# Patient Record
Sex: MALE | Race: WHITE | NOT HISPANIC OR LATINO | Employment: PART TIME | URBAN - METROPOLITAN AREA
[De-identification: names, ages, dates, MRNs, and addresses within clinical notes are randomized per-mention and may not be internally consistent; named-entity substitution may affect disease eponyms.]

---

## 2018-05-29 ENCOUNTER — TRANSCRIBE ORDERS (OUTPATIENT)
Dept: URGENT CARE | Facility: MEDICAL CENTER | Age: 23
End: 2018-05-29

## 2018-05-29 ENCOUNTER — APPOINTMENT (OUTPATIENT)
Dept: URGENT CARE | Facility: MEDICAL CENTER | Age: 23
End: 2018-05-29

## 2018-05-29 DIAGNOSIS — Z00.00 PHYSICAL EXAM: ICD-10-CM

## 2018-05-29 DIAGNOSIS — Z00.00 PHYSICAL EXAM: Primary | ICD-10-CM

## 2018-05-29 PROCEDURE — 86480 TB TEST CELL IMMUN MEASURE: CPT

## 2018-05-31 LAB
ANNOTATION COMMENT IMP: NORMAL
GAMMA INTERFERON BACKGROUND BLD IA-ACNC: 0.02 IU/ML
M TB IFN-G BLD-IMP: NEGATIVE
M TB IFN-G CD4+ BCKGRND COR BLD-ACNC: 0 IU/ML
M TB IFN-G CD4+ T-CELLS BLD-ACNC: 0.02 IU/ML
MITOGEN IGNF BLD-ACNC: 7.08 IU/ML
QUANTIFERON-TB GOLD IN TUBE: NORMAL
SERVICE CMNT-IMP: NORMAL

## 2019-07-24 ENCOUNTER — APPOINTMENT (OUTPATIENT)
Dept: LAB | Facility: CLINIC | Age: 24
End: 2019-07-24
Payer: COMMERCIAL

## 2019-07-24 ENCOUNTER — OFFICE VISIT (OUTPATIENT)
Dept: FAMILY MEDICINE CLINIC | Facility: CLINIC | Age: 24
End: 2019-07-24
Payer: COMMERCIAL

## 2019-07-24 VITALS
SYSTOLIC BLOOD PRESSURE: 128 MMHG | BODY MASS INDEX: 28.8 KG/M2 | WEIGHT: 248.9 LBS | HEIGHT: 78 IN | DIASTOLIC BLOOD PRESSURE: 82 MMHG | HEART RATE: 78 BPM | TEMPERATURE: 97.9 F

## 2019-07-24 DIAGNOSIS — Z01.84 IMMUNITY STATUS TESTING: ICD-10-CM

## 2019-07-24 DIAGNOSIS — Z23 ENCOUNTER FOR IMMUNIZATION: ICD-10-CM

## 2019-07-24 DIAGNOSIS — Z76.89 ENCOUNTER TO ESTABLISH CARE: ICD-10-CM

## 2019-07-24 DIAGNOSIS — Z00.00 ANNUAL PHYSICAL EXAM: Primary | ICD-10-CM

## 2019-07-24 PROCEDURE — 86706 HEP B SURFACE ANTIBODY: CPT

## 2019-07-24 PROCEDURE — 90715 TDAP VACCINE 7 YRS/> IM: CPT | Performed by: NURSE PRACTITIONER

## 2019-07-24 PROCEDURE — 36415 COLL VENOUS BLD VENIPUNCTURE: CPT

## 2019-07-24 PROCEDURE — 90471 IMMUNIZATION ADMIN: CPT | Performed by: NURSE PRACTITIONER

## 2019-07-24 PROCEDURE — 99385 PREV VISIT NEW AGE 18-39: CPT | Performed by: NURSE PRACTITIONER

## 2019-07-24 NOTE — PATIENT INSTRUCTIONS

## 2019-07-24 NOTE — PROGRESS NOTES
NAME: Lucina Mackenzie  AGE: 27 y o  SEX: male  : 1995     DATE: 2019     Assessment and Plan:     Problem List Items Addressed This Visit     None      Visit Diagnoses     Annual physical exam    -  Primary    Encounter to establish care        Immunity status testing        Relevant Orders    Hepatitis B surface antibody    Encounter for immunization        Relevant Orders    TDAP VACCINE GREATER THAN OR EQUAL TO 8YO IM (Completed)        Immunizations and preventive care screenings were discussed with patient today  Appropriate education was printed on patient's after visit summary  Counseling:  BMI Counseling: Body mass index is 28 76 kg/m²  Discussed the patient's BMI with him  The BMI is above average  BMI counseling and education was provided to the patient  Nutrition recommendations include reducing portion sizes, decreasing overall calorie intake, consuming healthier snacks, moderation in carbohydrate intake, increasing intake of lean protein and reducing intake of saturated fat and trans fat  Exercise recommendations include exercising 3-5 times per week and strength training exercises  Alcohol/drug use: discussed moderation in alcohol intake and avoidance of illicit drug use  Dental Health: discussed importance of regular tooth brushing, flossing, and dental visits  Injury prevention: discussed safety/seat belts, safety helmets, smoke detectors, carbon dioxide detectors, and smoking near bedding or upholstery  · Sexual health: discussed sexually transmitted diseases, partner selection, use of condoms, avoidance of unintended pregnancy, and contraceptive alternatives  Return in 1 year (on 2020)       Chief Complaint:     Chief Complaint   Patient presents with    Physical Exam      History of Present Illness:     Adult Annual Physical   24 y o male presenting with to become establish to the practice, obtain physical examination for college and to discuss current health concerns  The patient reports no problems  Patient's previous primary care provider  was Dr Uziel Lucero MD (pediatrician)  Patient will be attending [address] [place] [address] in the Fall 2019  General Health: The patient's describes his/her overall health as excellent  Health Maintenance:  She has regular dental visits  Denies vision problems (wears glasses/contacts)  Denies hearing loss (no assistive devices) Immunization status: up to date  Lifestyle: Consumes a diverse and healthy diet  Does not have weight concerns  Exercises regularly  Does not use tobacco  Denies alcohol use  Denies drug use    Diet and Physical Activity  · Diet/Nutrition: well balanced diet, limited junk food and consuming 3-5 servings of fruits/vegetables daily  · Exercise: 5-7 times a week on average and 30-60 minutes on average  Depression Screening  PHQ-9 Depression Screening    PHQ-9:    Frequency of the following problems over the past two weeks:       Little interest or pleasure in doing things:  0 - not at all  Feeling down, depressed, or hopeless:  0 - not at all  PHQ-2 Score:  0       General Health  · Sleep: sleeps well and gets 7-8 hours of sleep on average  · Hearing: normal - bilateral   · Vision: no vision problems, goes for regular eye exams, most recent eye exam <1 year ago and wears glasses and contacts  · Dental: regular dental visits, brushes teeth twice daily and flosses teeth occasionally   Health  · History of STDs?: no      Review of Systems:     Review of Systems   Constitutional: Negative  HENT: Negative  Eyes: Negative  Respiratory: Negative  Cardiovascular: Negative  Gastrointestinal: Negative  Endocrine: Negative  Genitourinary: Negative  Musculoskeletal: Negative  Skin: Negative  Allergic/Immunologic: Negative  Neurological: Negative  Hematological: Negative  Psychiatric/Behavioral: Negative         Past Medical History:     Past Medical History:   Diagnosis Date    Hypothyroidism     childhood      Past Surgical History:     Past Surgical History:   Procedure Laterality Date    ANTERIOR CRUCIATE LIGAMENT REPAIR Left       Social History:     Social History     Socioeconomic History    Marital status: Single     Spouse name: None    Number of children: None    Years of education: None    Highest education level: None   Occupational History    None   Social Needs    Financial resource strain: None    Food insecurity:     Worry: None     Inability: None    Transportation needs:     Medical: None     Non-medical: None   Tobacco Use    Smoking status: Never Smoker    Smokeless tobacco: Never Used   Substance and Sexual Activity    Alcohol use: Yes     Comment: social    Drug use: Never    Sexual activity: None   Lifestyle    Physical activity:     Days per week: None     Minutes per session: None    Stress: None   Relationships    Social connections:     Talks on phone: None     Gets together: None     Attends Confucianist service: None     Active member of club or organization: None     Attends meetings of clubs or organizations: None     Relationship status: None    Intimate partner violence:     Fear of current or ex partner: None     Emotionally abused: None     Physically abused: None     Forced sexual activity: None   Other Topics Concern    None   Social History Narrative    None      Family History:     Family History   Problem Relation Age of Onset    Hypertension Mother     Diabetes Father     Diabetes Paternal Grandfather       Current Medications:     No current outpatient medications on file  No current facility-administered medications for this visit         Allergies:     No Known Allergies   Physical Exam:     /82 (BP Location: Right arm, Patient Position: Sitting, Cuff Size: Standard)   Pulse 78   Temp 97 9 °F (36 6 °C)   Ht 6' 6" (1 981 m)   Wt 113 kg (248 lb 14 4 oz)   BMI 28 76 kg/m² (Reviewed)    Physical Exam   Constitutional: He is oriented to person, place, and time  Vital signs are normal  He appears well-developed and well-nourished  HENT:   Head: Normocephalic and atraumatic  Right Ear: Tympanic membrane, external ear and ear canal normal    Left Ear: Tympanic membrane, external ear and ear canal normal    Nose: Nose normal    Mouth/Throat: Uvula is midline, oropharynx is clear and moist and mucous membranes are normal    Eyes: Pupils are equal, round, and reactive to light  Conjunctivae, EOM and lids are normal    Neck: Trachea normal  Neck supple  Cardiovascular: Normal rate, regular rhythm, normal heart sounds and intact distal pulses  Pulmonary/Chest: Effort normal and breath sounds normal    Abdominal: Soft  Bowel sounds are normal  There is no tenderness  Musculoskeletal: Normal range of motion  Lymphadenopathy:     He has no cervical adenopathy  Neurological: He is alert and oriented to person, place, and time  He has normal strength and normal reflexes  No cranial nerve deficit or sensory deficit  He displays a negative Romberg sign  Skin: Skin is warm and dry  Capillary refill takes less than 2 seconds  No cyanosis  Nails show no clubbing  Psychiatric: He has a normal mood and affect  His speech is normal and behavior is normal  Judgment and thought content normal      BMI Counseling: Body mass index is 28 76 kg/m²  Discussed the patient's BMI with him  The BMI is above average  BMI counseling and education was provided to the patient  Nutrition recommendations include reducing portion sizes, decreasing overall calorie intake, consuming healthier snacks, moderation in carbohydrate intake, increasing intake of lean protein and reducing intake of saturated fat and trans fat  Exercise recommendations include exercising 3-5 times per week

## 2019-07-25 LAB — HBV SURFACE AB SER-ACNC: 22.6 MIU/ML

## 2019-08-25 ENCOUNTER — TRANSCRIBE ORDERS (OUTPATIENT)
Dept: URGENT CARE | Facility: MEDICAL CENTER | Age: 24
End: 2019-08-25

## 2019-08-25 ENCOUNTER — APPOINTMENT (OUTPATIENT)
Dept: URGENT CARE | Facility: MEDICAL CENTER | Age: 24
End: 2019-08-25

## 2019-08-25 DIAGNOSIS — Z02.1 PRE-EMPLOYMENT HEALTH SCREENING EXAMINATION: Primary | ICD-10-CM

## 2019-08-25 DIAGNOSIS — Z02.1 PRE-EMPLOYMENT HEALTH SCREENING EXAMINATION: ICD-10-CM

## 2019-08-25 PROCEDURE — 86480 TB TEST CELL IMMUN MEASURE: CPT

## 2019-08-27 LAB
GAMMA INTERFERON BACKGROUND BLD IA-ACNC: 0.03 IU/ML
M TB IFN-G BLD-IMP: NEGATIVE
M TB IFN-G CD4+ BCKGRND COR BLD-ACNC: -0.01 IU/ML
M TB IFN-G CD4+ BCKGRND COR BLD-ACNC: 0 IU/ML
MITOGEN IGNF BCKGRD COR BLD-ACNC: >10 IU/ML

## 2020-12-07 ENCOUNTER — TELEPHONE (OUTPATIENT)
Dept: FAMILY MEDICINE CLINIC | Facility: CLINIC | Age: 25
End: 2020-12-07

## 2020-12-07 DIAGNOSIS — Z03.818 ENCOUNTER FOR OBSERVATION FOR SUSPECTED EXPOSURE TO OTHER BIOLOGICAL AGENTS RULED OUT: Primary | ICD-10-CM

## 2020-12-08 DIAGNOSIS — Z03.818 ENCOUNTER FOR OBSERVATION FOR SUSPECTED EXPOSURE TO OTHER BIOLOGICAL AGENTS RULED OUT: ICD-10-CM

## 2020-12-08 PROCEDURE — U0003 INFECTIOUS AGENT DETECTION BY NUCLEIC ACID (DNA OR RNA); SEVERE ACUTE RESPIRATORY SYNDROME CORONAVIRUS 2 (SARS-COV-2) (CORONAVIRUS DISEASE [COVID-19]), AMPLIFIED PROBE TECHNIQUE, MAKING USE OF HIGH THROUGHPUT TECHNOLOGIES AS DESCRIBED BY CMS-2020-01-R: HCPCS | Performed by: NURSE PRACTITIONER

## 2020-12-09 LAB — SARS-COV-2 RNA SPEC QL NAA+PROBE: NOT DETECTED

## 2021-01-22 ENCOUNTER — TELEPHONE (OUTPATIENT)
Dept: FAMILY MEDICINE CLINIC | Facility: CLINIC | Age: 26
End: 2021-01-22

## 2021-02-05 ENCOUNTER — IMMUNIZATIONS (OUTPATIENT)
Dept: FAMILY MEDICINE CLINIC | Facility: HOSPITAL | Age: 26
End: 2021-02-05

## 2021-02-05 DIAGNOSIS — Z23 ENCOUNTER FOR IMMUNIZATION: Primary | ICD-10-CM

## 2021-02-05 PROCEDURE — 91300 SARS-COV-2 / COVID-19 MRNA VACCINE (PFIZER-BIONTECH) 30 MCG: CPT

## 2021-02-05 PROCEDURE — 0001A SARS-COV-2 / COVID-19 MRNA VACCINE (PFIZER-BIONTECH) 30 MCG: CPT

## 2021-02-26 ENCOUNTER — IMMUNIZATIONS (OUTPATIENT)
Dept: FAMILY MEDICINE CLINIC | Facility: HOSPITAL | Age: 26
End: 2021-02-26

## 2021-02-26 DIAGNOSIS — Z23 ENCOUNTER FOR IMMUNIZATION: Primary | ICD-10-CM

## 2021-02-26 PROCEDURE — 0002A SARS-COV-2 / COVID-19 MRNA VACCINE (PFIZER-BIONTECH) 30 MCG: CPT

## 2021-02-26 PROCEDURE — 91300 SARS-COV-2 / COVID-19 MRNA VACCINE (PFIZER-BIONTECH) 30 MCG: CPT

## 2022-03-20 ENCOUNTER — OFFICE VISIT (OUTPATIENT)
Dept: URGENT CARE | Age: 27
End: 2022-03-20
Payer: COMMERCIAL

## 2022-03-20 VITALS — HEART RATE: 120 BPM | RESPIRATION RATE: 20 BRPM | TEMPERATURE: 97.6 F | OXYGEN SATURATION: 97 %

## 2022-03-20 DIAGNOSIS — R68.89 FLU-LIKE SYMPTOMS: Primary | ICD-10-CM

## 2022-03-20 DIAGNOSIS — H65.02 NON-RECURRENT ACUTE SEROUS OTITIS MEDIA OF LEFT EAR: ICD-10-CM

## 2022-03-20 PROCEDURE — S9083 URGENT CARE CENTER GLOBAL: HCPCS

## 2022-03-20 PROCEDURE — G0382 LEV 3 HOSP TYPE B ED VISIT: HCPCS

## 2022-03-20 PROCEDURE — 87636 SARSCOV2 & INF A&B AMP PRB: CPT

## 2022-03-20 RX ORDER — AMOXICILLIN AND CLAVULANATE POTASSIUM 875; 125 MG/1; MG/1
1 TABLET, FILM COATED ORAL EVERY 12 HOURS SCHEDULED
Qty: 20 TABLET | Refills: 0 | Status: SHIPPED | OUTPATIENT
Start: 2022-03-20 | End: 2022-03-30

## 2022-03-20 NOTE — PROGRESS NOTES
[facility]        NAME: Brittani Castillo is a 24 y o  male  : 1995    MRN: [id number]  DATE: 2022  TIME: 8:00 PM    Assessment and Plan   Flu-like symptoms [R68 89]  1  Flu-like symptoms  [facility]   2  Non-recurrent acute serous otitis media of left ear  amoxicillin-clavulanate (AUGMENTIN) 875-125 mg per tablet     COVID/flu test pending  Patient Instructions     Antibiotics as discussed for otitis media  Appropriate Tylenol as needed for fever  Follow up with PCP in 3-5 days  Proceed to  ER if symptoms worsen  Chief Complaint     Chief Complaint   Patient presents with    Cold Like Symptoms     c/o fever , bodyaches,chills x yesterday am         History of Present Illness       Patient presenting for evaluation of fever, congestion, cough, runny nose, body aches and low back pain  Patient states that he read recently traveled out of state, and when he returned he developed the symptoms  He states that he is vaccinated for both COVID and flu  He states that he has been taking Tylenol and ibuprofen for his symptoms  He states his T-max was 103  6  He states that he has a decreased appetite, but is able to tolerate liquids and has been urinating as normal   He denies any chest pain, shortness of breath, diarrhea or vomiting at this time      Review of Systems   Review of Systems   Constitutional: Positive for chills, fatigue and fever  HENT: Positive for congestion, ear pain and sore throat  Eyes: Negative  Respiratory: Positive for cough  Negative for shortness of breath  Cardiovascular: Negative  Gastrointestinal: Negative for abdominal pain, diarrhea and vomiting  Endocrine: Negative  Genitourinary: Negative  Musculoskeletal: Positive for back pain and myalgias  Skin: Negative  Allergic/Immunologic: Negative  Neurological: Negative for headaches  Hematological: Negative      Psychiatric/Behavioral: Negative  Current Medications       Current Outpatient Medications:     amoxicillin-clavulanate (AUGMENTIN) 875-125 mg per tablet, Take 1 tablet by mouth every 12 (twelve) hours for 10 days, Disp: 20 tablet, Rfl: 0    Current Allergies     Allergies as of 03/20/2022    (No Known Allergies)            The following portions of the patient's history were reviewed and updated as appropriate: allergies, current medications, past family history, past medical history, past social history, past surgical history and problem list      Past Medical History:   Diagnosis Date    Hypothyroidism     childhood       Past Surgical History:   Procedure Laterality Date    ANTERIOR CRUCIATE LIGAMENT REPAIR Left        Family History   Problem Relation Age of Onset    Hypertension Mother     Diabetes Father     Diabetes Paternal Grandfather          Medications have been verified  Objective   Pulse (!) 120   Temp 97 6 °F (36 4 °C)   Resp 20   SpO2 97%        Physical Exam     Physical Exam  Vitals and nursing note reviewed  Constitutional:       General: He is not in acute distress  Appearance: Normal appearance  He is not ill-appearing  HENT:      Head: Normocephalic and atraumatic  Right Ear: Tympanic membrane is erythematous  Left Ear: Tympanic membrane is injected, erythematous and bulging  Nose: Congestion present  No rhinorrhea  Mouth/Throat:      Mouth: Mucous membranes are moist       Pharynx: Oropharynx is clear  Uvula midline  Posterior oropharyngeal erythema present  No oropharyngeal exudate or uvula swelling  Tonsils: No tonsillar exudate or tonsillar abscesses  0 on the right  0 on the left  Eyes:      Conjunctiva/sclera: Conjunctivae normal    Cardiovascular:      Rate and Rhythm: Normal rate and regular rhythm  Pulses: Normal pulses  Heart sounds: Normal heart sounds  No murmur heard  No friction rub  No gallop      Pulmonary:      Effort: Pulmonary effort is normal  No respiratory distress  Breath sounds: Normal breath sounds  No stridor  No wheezing, rhonchi or rales  Abdominal:      General: Bowel sounds are normal       Palpations: Abdomen is soft  Tenderness: There is no abdominal tenderness  Musculoskeletal:         General: Normal range of motion  Cervical back: Normal range of motion and neck supple  Skin:     General: Skin is warm and dry  Capillary Refill: Capillary refill takes less than 2 seconds  Neurological:      General: No focal deficit present  Mental Status: He is alert and oriented to person, place, and time     Psychiatric:         Mood and Affect: Mood normal

## 2022-03-20 NOTE — PATIENT INSTRUCTIONS
1  Drink plenty fluids  2   Take probiotics [i e  Yogurt, Acidophilus, Florastor (liquid)] daily  3   Over-the-counter medications as needed for symptomatic care  4    Advance activities as tolerated  5    Follow-up with your primary care physician in 3-4 days  6   Go to emergency room if symptoms are worsening  7   Use a humidifier at bedtime  Gastroenteritis   WHAT YOU NEED TO KNOW:   Gastroenteritis, or stomach flu, is an infection of the stomach and intestines  DISCHARGE INSTRUCTIONS:   Call 911 for any of the following:   · You have trouble breathing or a very fast pulse  Return to the emergency department if:   · You see blood in your diarrhea  · You cannot stop vomiting  · You have not urinated for 12 hours  · You feel like you are going to faint  Contact your healthcare provider if:   · You have a fever  · You continue to vomit or have diarrhea, even after treatment  · You see worms in your diarrhea  · Your mouth or eyes are dry  You are not urinating as much or as often  · You have questions or concerns about your condition or care  Medicines:   · Medicines  may be given to stop vomiting or diarrhea, decrease abdominal cramps, or treat an infection  · Take your medicine as directed  Contact your healthcare provider if you think your medicine is not helping or if you have side effects  Tell him or her if you are allergic to any medicine  Keep a list of the medicines, vitamins, and herbs you take  Include the amounts, and when and why you take them  Bring the list or the pill bottles to follow-up visits  Carry your medicine list with you in case of an emergency  Manage your symptoms:   · Drink liquids as directed  Ask your healthcare provider how much liquid to drink each day, and which liquids are best for you  You may also need to drink an oral rehydration solution (ORS)   An ORS has the right amounts of sugar, salt, and minerals in water to replace body fluids  · Eat bland foods  When you feel hungry, begin eating soft, bland foods  Examples are bananas, clear soup, potatoes, and applesauce  Do not have dairy products, alcohol, sugary drinks, or drinks with caffeine until you feel better  · Rest as much as possible  Slowly start to do more each day when you begin to feel better  Prevent the spread of gastroenteritis:  Gastroenteritis can spread easily  Keep yourself, your family, and your surroundings clean to help prevent the spread of gastroenteritis:  · Wash your hands often  Use soap and water  Wash your hands after you use the bathroom, change a child's diapers, or sneeze  Wash your hands before you prepare or eat food  · Clean surfaces and do laundry often  Wash your clothes and towels separately from the rest of the laundry  Clean surfaces in your home with antibacterial  or bleach  · Clean food thoroughly and cook safely  Wash raw vegetables before you cook  Cook meat, fish, and eggs fully  Do not use the same dishes for raw meat as you do for other foods  Refrigerate any leftover food immediately  · Be aware when you camp or travel  Drink only clean water  Do not drink from rivers or lakes unless you purify or boil the water first  When you travel, drink bottled water and do not add ice  Do not eat fruit that has not been peeled  Do not eat raw fish or meat that is not fully cooked  Follow up with your doctor as directed:  Write down your questions so you remember to ask them during your visits  © Copyright [employer] 2022 Information is for End User's use only and may not be sold, redistributed or otherwise used for commercial purposes  All illustrations and images included in CareNotes® are the copyrighted property of [employer]  or [employer]  The above information is an  only  It is not intended as medical advice for individual conditions or treatments   Talk to your doctor, nurse or pharmacist before following any medical regimen to see if it is safe and effective for you  Otitis Media, Ambulatory Care   GENERAL INFORMATION:   Otitis media  is an ear infection  Common symptoms include the following:   · Fever or a headache    · Ear pain    · Trouble hearing    · Ear feels plugged or full or you have ringing or buzzing in your ear    · Dizziness or you lose your balance    · Nausea or vomiting  Seek immediate care for the following symptoms:   · Seizure    · Fever and a stiff neck  Treatment for otitis media  may include any of the following:  · NSAIDs  help decrease swelling and pain or fever  This medicine is available with or without a doctor's order  NSAIDs can cause stomach bleeding or kidney problems in certain people  If you take blood thinner medicine, always ask your healthcare provider if NSAIDs are safe for you  Always read the medicine label and follow directions  · Ear drops  to help treat your ear pain  · Antibiotics  to help kill the germs that caused your ear infection  Care for otitis media:   · Use heat  Place a warm, moist washcloth on your ear to decrease pain  Apply for 15 to 20 minutes, 3 to 4 times a day    · Use ice  Ice helps decrease swelling and pain  Use an ice pack or put crushed ice in a plastic bag  Cover the ice pack with a towel and place it on your ear for 15 to 20 minutes, 3 to 4 times a day for 2 days  Prevent otitis media:   · Wash your hands often  This will help prevent the spread of germs  Encourage everyone in your house to wash their hands with soap and water after they use the bathroom  Everyone should also wash their hands after they change a child's diaper and before they prepare or eat food  · Stay away from people who are ill  Germs are easily and quickly spread through contact  Follow up with your healthcare provider as directed:  Write down your questions so you remember to ask them during your visits     CARE AGREEMENT:   You have the right to help plan your care  Learn about your health condition and how it may be treated  Discuss treatment options with your caregivers to decide what care you want to receive  You always have the right to refuse treatment  The above information is an  only  It is not intended as medical advice for individual conditions or treatments  Talk to your doctor, nurse or pharmacist before following any medical regimen to see if it is safe and effective for you  © 2014 [address] is for End User's use only and may not be sold, redistributed or otherwise used for commercial purposes  All illustrations and images included in CareNotes® are the copyrighted property of [employer]  or [employer]

## 2022-03-21 LAB
FLUAV RNA RESP QL NAA+PROBE: POSITIVE
FLUBV RNA RESP QL NAA+PROBE: NEGATIVE
SARS-COV-2 RNA RESP QL NAA+PROBE: NEGATIVE

## 2022-05-02 ENCOUNTER — APPOINTMENT (OUTPATIENT)
Dept: LAB | Facility: CLINIC | Age: 27
End: 2022-05-02
Payer: COMMERCIAL

## 2022-05-02 DIAGNOSIS — Z00.8 HEALTH EXAMINATION IN POPULATION SURVEY: ICD-10-CM

## 2022-05-02 LAB
CHOLEST SERPL-MCNC: 192 MG/DL
EST. AVERAGE GLUCOSE BLD GHB EST-MCNC: 103 MG/DL
HBA1C MFR BLD: 5.2 %
HDLC SERPL-MCNC: 47 MG/DL
LDLC SERPL CALC-MCNC: 114 MG/DL (ref 0–100)
NONHDLC SERPL-MCNC: 145 MG/DL
TRIGL SERPL-MCNC: 155 MG/DL

## 2022-05-02 PROCEDURE — 80061 LIPID PANEL: CPT

## 2022-05-02 PROCEDURE — 36415 COLL VENOUS BLD VENIPUNCTURE: CPT

## 2022-05-02 PROCEDURE — 83036 HEMOGLOBIN GLYCOSYLATED A1C: CPT

## 2022-08-25 ENCOUNTER — OFFICE VISIT (OUTPATIENT)
Dept: FAMILY MEDICINE CLINIC | Facility: CLINIC | Age: 27
End: 2022-08-25
Payer: COMMERCIAL

## 2022-08-25 ENCOUNTER — APPOINTMENT (OUTPATIENT)
Dept: LAB | Facility: CLINIC | Age: 27
End: 2022-08-25
Payer: COMMERCIAL

## 2022-08-25 VITALS
HEIGHT: 78 IN | DIASTOLIC BLOOD PRESSURE: 70 MMHG | SYSTOLIC BLOOD PRESSURE: 112 MMHG | OXYGEN SATURATION: 97 % | BODY MASS INDEX: 30.64 KG/M2 | WEIGHT: 264.8 LBS | HEART RATE: 74 BPM | TEMPERATURE: 97.5 F

## 2022-08-25 DIAGNOSIS — Z86.39 HISTORY OF HYPOTHYROIDISM AS A CHILD: ICD-10-CM

## 2022-08-25 DIAGNOSIS — Z00.00 ANNUAL PHYSICAL EXAM: Primary | ICD-10-CM

## 2022-08-25 DIAGNOSIS — L23.0 ALLERGIC CONTACT DERMATITIS DUE TO METALS: ICD-10-CM

## 2022-08-25 LAB
T4 FREE SERPL-MCNC: 0.91 NG/DL (ref 0.76–1.46)
TSH SERPL DL<=0.05 MIU/L-ACNC: 6.35 UIU/ML (ref 0.45–4.5)

## 2022-08-25 PROCEDURE — 36415 COLL VENOUS BLD VENIPUNCTURE: CPT

## 2022-08-25 PROCEDURE — 86800 THYROGLOBULIN ANTIBODY: CPT

## 2022-08-25 PROCEDURE — 84443 ASSAY THYROID STIM HORMONE: CPT

## 2022-08-25 PROCEDURE — 86376 MICROSOMAL ANTIBODY EACH: CPT

## 2022-08-25 PROCEDURE — 99395 PREV VISIT EST AGE 18-39: CPT | Performed by: NURSE PRACTITIONER

## 2022-08-25 PROCEDURE — 84439 ASSAY OF FREE THYROXINE: CPT

## 2022-08-25 RX ORDER — TRIAMCINOLONE ACETONIDE 1 MG/G
CREAM TOPICAL 2 TIMES DAILY
Qty: 30 G | Refills: 0 | Status: SHIPPED | OUTPATIENT
Start: 2022-08-25 | End: 2022-10-12 | Stop reason: ALTCHOICE

## 2022-08-25 NOTE — PATIENT INSTRUCTIONS

## 2022-08-25 NOTE — PROGRESS NOTES
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        Relevant Medications    triamcinolone (KENALOG) 0 1 % cream    History of hypothyroidism as a child        Relevant Orders    TSH, 3rd generation with Free T4 reflex    Thyroid Antibodies Panel        Immunizations and preventive care screenings were discussed with patient today  Appropriate education was printed on patient's after visit summary  Counseling:  Dental Health: discussed importance of regular tooth brushing, flossing, and dental visits  Exercise: the importance of regular exercise/physical activity was discussed  Recommend exercise 3-5 times per week for at least 30 minutes  Return in about 1 year (around 2023)  Chief Complaint:     Chief Complaint   Patient presents with    Physical Exam    need thyroid labs     Dermatitis      History of Present Illness:     Adult Annual Physical   Patient here for a comprehensive physical exam  The patient reports he has been feeling more fatigued lately and been experiencing some unexplained weight gain  He remembered as a child he was diagnosed with hypothyroidism and placed on levothyroxine but did not take the medication or follow-up on it  He is interested in obtaining some labs to check his thyroid at this time  Patient has also noticed a contact dermatitis on the lateral aspect of left lower leg were it touches the metal strip on his care  He has a history of metal contact dermatitis  Diet and Physical Activity  Diet/Nutrition: well balanced diet and consuming 3-5 servings of fruits/vegetables daily     Exercise: moderate cardiovascular exercise, 3-4 times a week on average and 30-60 minutes on average  Depression Screening  PHQ-2/9 Depression Screening    Little interest or pleasure in doing things: 0 - not at all  Feeling down, depressed, or hopeless: 0 - not at all  PHQ-2 Score: 0  PHQ-2 Interpretation: Negative depression screen       General Health  Sleep: sleeps well  Hearing: normal - bilateral   Vision: no vision problems and wears contacts  Dental: regular dental visits, brushes teeth twice daily and flosses teeth occasionally   Health  History of STDs?: no      Review of Systems:     Review of Systems   Constitutional: Positive for fatigue and unexpected weight change  Negative for activity change and appetite change  HENT: Negative for dental problem, ear pain, hearing loss, nosebleeds, sneezing, sore throat, tinnitus and trouble swallowing  Eyes: Negative for visual disturbance  Respiratory: Negative for cough, chest tightness, shortness of breath and wheezing  Cardiovascular: Negative for chest pain, palpitations and leg swelling  Gastrointestinal: Negative for abdominal pain, constipation, diarrhea and nausea  Endocrine: Negative for polydipsia and polyuria  Genitourinary: Negative  Musculoskeletal: Negative for arthralgias, back pain, myalgias and neck pain  Skin: Negative for color change and rash  Allergic/Immunologic: Negative for environmental allergies  Neurological: Negative for dizziness, weakness, light-headedness and headaches  Hematological: Negative  Psychiatric/Behavioral: Negative  Negative for dysphoric mood and sleep disturbance  The patient is not nervous/anxious         Past Medical History:     Past Medical History:   Diagnosis Date    Hypothyroidism     childhood      Past Surgical History:     Past Surgical History:   Procedure Laterality Date    ANTERIOR CRUCIATE LIGAMENT REPAIR Left       Social History:     Social History     Socioeconomic History    Marital status: Single     Spouse name: None    Number of children: None    Years of education: None    Highest education level: None   Occupational History    None   Tobacco Use    Smoking status: Never Smoker    Smokeless tobacco: Never Used   Substance and Sexual Activity    Alcohol use: Yes     Comment: social    Drug use: Never    Sexual activity: None   Other Topics Concern    None   Social History Narrative    None     Social Determinants of Health     Financial Resource Strain: Not on file   Food Insecurity: Not on file   Transportation Needs: Not on file   Physical Activity: Not on file   Stress: Not on file   Social Connections: Not on file   Intimate Partner Violence: Not on file   Housing Stability: Not on file      Family History:     Family History   Problem Relation Age of Onset    Hypertension Mother     Diabetes Father     Diabetes Paternal Grandfather       Current Medications:     Current Outpatient Medications   Medication Sig Dispense Refill    triamcinolone (KENALOG) 0 1 % cream Apply topically 2 (two) times a day 30 g 0     No current facility-administered medications for this visit  Allergies:     No Known Allergies   Physical Exam:     /70   Pulse 74   Temp 97 5 °F (36 4 °C)   Ht 6' 6" (1 981 m)   Wt 120 kg (264 lb 12 8 oz)   SpO2 97%   BMI 30 60 kg/m² (Reviewed)    Physical Exam  Vitals reviewed  Constitutional:       General: He is not in acute distress  Appearance: He is not ill-appearing  HENT:      Head: Normocephalic and atraumatic  Right Ear: External ear normal       Left Ear: External ear normal       Nose: Nose normal       Mouth/Throat:      Mouth: Mucous membranes are moist       Pharynx: Oropharynx is clear  Eyes:      Extraocular Movements: Extraocular movements intact  Conjunctiva/sclera: Conjunctivae normal       Pupils: Pupils are equal, round, and reactive to light  Cardiovascular:      Rate and Rhythm: Normal rate and regular rhythm        Heart sounds: No murmur heard     No friction rub  No gallop  Pulmonary:      Effort: Pulmonary effort is normal    Abdominal:      General: Abdomen is flat  Bowel sounds are normal  There is no distension  Palpations: Abdomen is soft  Tenderness: There is no abdominal tenderness  Musculoskeletal:      Cervical back: Neck supple  Skin:     General: Skin is warm and dry  Capillary Refill: Capillary refill takes less than 2 seconds  Neurological:      General: No focal deficit present  Mental Status: He is alert and oriented to person, place, and time     Psychiatric:         Mood and Affect: Mood normal          Behavior: Behavior normal           Ami Thomas, [address] [place] [address]

## 2022-08-26 DIAGNOSIS — E03.9 ACQUIRED HYPOTHYROIDISM: Primary | ICD-10-CM

## 2022-08-26 LAB
THYROGLOB AB SERPL-ACNC: <1 IU/ML (ref 0–0.9)
THYROPEROXIDASE AB SERPL-ACNC: <8 IU/ML (ref 0–34)

## 2022-08-26 RX ORDER — LEVOTHYROXINE SODIUM 0.03 MG/1
25 TABLET ORAL DAILY
Qty: 30 TABLET | Refills: 3 | Status: SHIPPED | OUTPATIENT
Start: 2022-08-26 | End: 2022-10-10

## 2022-10-10 ENCOUNTER — APPOINTMENT (OUTPATIENT)
Dept: LAB | Facility: CLINIC | Age: 27
End: 2022-10-10
Payer: COMMERCIAL

## 2022-10-10 DIAGNOSIS — E03.9 ACQUIRED HYPOTHYROIDISM: Primary | ICD-10-CM

## 2022-10-10 DIAGNOSIS — E03.9 ACQUIRED HYPOTHYROIDISM: ICD-10-CM

## 2022-10-10 LAB — TSH SERPL DL<=0.05 MIU/L-ACNC: 5.13 UIU/ML (ref 0.45–4.5)

## 2022-10-10 PROCEDURE — 84443 ASSAY THYROID STIM HORMONE: CPT

## 2022-10-10 PROCEDURE — 36415 COLL VENOUS BLD VENIPUNCTURE: CPT

## 2022-10-10 RX ORDER — LEVOTHYROXINE SODIUM 0.07 MG/1
75 TABLET ORAL
Qty: 90 TABLET | Refills: 3 | Status: SHIPPED | OUTPATIENT
Start: 2022-10-10

## 2022-10-12 ENCOUNTER — OFFICE VISIT (OUTPATIENT)
Dept: FAMILY MEDICINE CLINIC | Facility: CLINIC | Age: 27
End: 2022-10-12
Payer: COMMERCIAL

## 2022-10-12 VITALS
TEMPERATURE: 98.4 F | BODY MASS INDEX: 30.14 KG/M2 | WEIGHT: 260.5 LBS | HEIGHT: 78 IN | SYSTOLIC BLOOD PRESSURE: 122 MMHG | DIASTOLIC BLOOD PRESSURE: 80 MMHG | OXYGEN SATURATION: 98 % | HEART RATE: 65 BPM

## 2022-10-12 DIAGNOSIS — E03.9 ACQUIRED HYPOTHYROIDISM: Primary | ICD-10-CM

## 2022-10-12 PROCEDURE — 99213 OFFICE O/P EST LOW 20 MIN: CPT | Performed by: NURSE PRACTITIONER

## 2022-10-12 NOTE — PROGRESS NOTES
Name: Jean Chan      : 1995      MRN: [id number]  Encounter Provider: VIKI Campos  Encounter Date: 10/12/2022   Encounter department: [address] [place] [address] Road     1  Acquired hypothyroidism    BMI Counseling: Body mass index is 30 1 kg/m²  The BMI is above normal  Nutrition recommendations include decreasing portion sizes, encouraging healthy choices of fruits and vegetables, consuming healthier snacks, moderation in carbohydrate intake and increasing intake of lean protein  Exercise recommendations include exercising 3-5 times per week and strength training exercises  Rationale for BMI follow-up plan is due to patient being overweight or obese  Continue levothyroxine as ordered  Obtain blood work for [facility] on or around 22  Will review labs at that time to determine future plan of care  Call office with concerns  Subjective      Patient is a 24year old male who presents today to follow-up regarding recent increase in levothyroxine medication and to review recent TSH level  He offers no complaints at this time  Reports he is using the Kenalog cream intermittently as needed for rash on his leg  Review of Systems   Constitutional: Negative  Negative for fatigue and fever  HENT: Negative  Eyes: Negative  Respiratory: Negative  Negative for cough  Cardiovascular: Negative  Negative for chest pain  Gastrointestinal: Negative  Negative for constipation and diarrhea  Endocrine: Negative  Genitourinary: Negative  Musculoskeletal: Negative  Negative for myalgias  Skin: Negative  Negative for rash  Allergic/Immunologic: Negative  Neurological: Negative  Negative for light-headedness and headaches  Hematological: Negative  Psychiatric/Behavioral: Negative          Current Outpatient Medications on File Prior to Visit   Medication Sig   • levothyroxine (Synthroid) 75 mcg tablet Take 1 tablet (75 mcg total) by mouth daily in the early morning   • [DISCONTINUED] triamcinolone (KENALOG) 0 1 % cream Apply topically 2 (two) times a day       Objective     /80   Pulse 65   Temp 98 4 °F (36 9 °C)   Ht 6' 6" (1 981 m)   Wt 118 kg (260 lb 8 oz)   SpO2 98%   BMI 30 10 kg/m²  (Reviewed)    Physical Exam  Vitals and nursing note reviewed  Constitutional:       Appearance: Normal appearance  He is not ill-appearing  HENT:      Head: Normocephalic and atraumatic  Right Ear: External ear normal       Left Ear: External ear normal       Nose: Nose normal       Mouth/Throat:      Mouth: Mucous membranes are moist       Pharynx: Oropharynx is clear  Eyes:      Extraocular Movements: Extraocular movements intact  Conjunctiva/sclera: Conjunctivae normal       Pupils: Pupils are equal, round, and reactive to light  Cardiovascular:      Rate and Rhythm: Normal rate  Pulmonary:      Effort: Pulmonary effort is normal    Abdominal:      General: Abdomen is flat  Musculoskeletal:         General: Normal range of motion  Cervical back: Normal range of motion  Skin:     General: Skin is warm and dry  Neurological:      General: No focal deficit present  Mental Status: He is alert and oriented to person, place, and time  Psychiatric:         Mood and Affect: Mood normal          Behavior: Behavior normal          Thought Content:  Thought content normal          Judgment: Judgment normal        Luwanna Mohs, CRNP

## 2022-11-21 ENCOUNTER — LAB (OUTPATIENT)
Dept: LAB | Facility: CLINIC | Age: 27
End: 2022-11-21

## 2022-11-21 DIAGNOSIS — E03.9 ACQUIRED HYPOTHYROIDISM: ICD-10-CM

## 2022-11-21 LAB — TSH SERPL DL<=0.05 MIU/L-ACNC: 3.79 UIU/ML (ref 0.45–4.5)

## 2022-11-22 ENCOUNTER — OFFICE VISIT (OUTPATIENT)
Dept: FAMILY MEDICINE CLINIC | Facility: CLINIC | Age: 27
End: 2022-11-22

## 2022-11-22 VITALS
WEIGHT: 264 LBS | RESPIRATION RATE: 16 BRPM | BODY MASS INDEX: 30.55 KG/M2 | SYSTOLIC BLOOD PRESSURE: 122 MMHG | HEIGHT: 78 IN | HEART RATE: 88 BPM | DIASTOLIC BLOOD PRESSURE: 70 MMHG | TEMPERATURE: 98 F | OXYGEN SATURATION: 98 %

## 2022-11-22 DIAGNOSIS — E03.9 ACQUIRED HYPOTHYROIDISM: Primary | ICD-10-CM

## 2022-11-22 NOTE — PROGRESS NOTES
Name: Luisito Galvan      : 1995      MRN: [id number]  Encounter Provider: VIKI Oneill  Encounter Date: 2022   Encounter department: [address] [place] [address]     1  Acquired hypothyroidism  -     TSH, 3rd generation; Future; Expected date: 2023  Discussed with patient plan to continue levothyroxine doses at present level and will recheck TSH prior to annual physical  Patient instructed to call if no improvement in 72 hours or symptoms worsen       Subjective      27 y o male presenting for a 6 week follow-up for hypothyroidism after being started on levothyroxine  Patient recently had his TSH repeated which was in normal limits  Patient denies heat or cold intolerance, fatigue, palpitations, chest pain, shortness a breath or generalized body aches  Review of Systems   Constitutional: Negative  Respiratory: Negative  Cardiovascular: Negative  Gastrointestinal: Negative  Endocrine: Negative for cold intolerance and heat intolerance  Neurological: Negative  Psychiatric/Behavioral: Negative  Current Outpatient Medications on File Prior to Visit   Medication Sig   • levothyroxine (Synthroid) 75 mcg tablet Take 1 tablet (75 mcg total) by mouth daily in the early morning       Objective     /70   Pulse 88   Temp 98 °F (36 7 °C)   Resp 16   Ht 6' 6" (1 981 m)   Wt 120 kg (264 lb)   SpO2 98%   BMI 30 51 kg/m² (Reviewed)    Physical Exam  Vitals reviewed  Constitutional:       General: He is not in acute distress  Appearance: He is well-developed and well-groomed  He is not ill-appearing  HENT:      Head: Normocephalic and atraumatic  Eyes:      General: Lids are normal       Extraocular Movements: Extraocular movements intact  Conjunctiva/sclera: Conjunctivae normal       Pupils: Pupils are equal, round, and reactive to light     Neck:      Trachea: Trachea and phonation normal    Cardiovascular:      Rate and Rhythm: Normal rate and regular rhythm  Heart sounds: Normal heart sounds  Pulmonary:      Effort: Pulmonary effort is normal       Breath sounds: Normal breath sounds  Skin:     General: Skin is warm and dry  Capillary Refill: Capillary refill takes less than 2 seconds  Neurological:      General: No focal deficit present  Mental Status: He is alert and oriented to person, place, and time  Psychiatric:         Mood and Affect: Mood normal          Behavior: Behavior normal  Behavior is cooperative  Thought Content:  Thought content normal        VIKI Raza

## 2023-01-24 ENCOUNTER — TELEPHONE (OUTPATIENT)
Dept: FAMILY MEDICINE CLINIC | Facility: CLINIC | Age: 28
End: 2023-01-24

## 2023-01-24 NOTE — TELEPHONE ENCOUNTER
Patient states that he started taking Levothyroxine in October with a dose of 25 mcg    He says that his most recent prescription that he picked up is for 75 mcg    He states he does not remember discussing increasing the medication and is wondering why he has been prescribed a higher dose    He says he has been cutting the tablet in half because he feels that 25 to 75 is a big jump

## 2023-05-22 ENCOUNTER — APPOINTMENT (OUTPATIENT)
Dept: LAB | Facility: CLINIC | Age: 28
End: 2023-05-22

## 2023-05-22 DIAGNOSIS — E03.9 ACQUIRED HYPOTHYROIDISM: ICD-10-CM

## 2023-05-22 DIAGNOSIS — Z00.8 HEALTH EXAMINATION IN POPULATION SURVEY: ICD-10-CM

## 2023-05-22 LAB
CHOLEST SERPL-MCNC: 166 MG/DL
HDLC SERPL-MCNC: 47 MG/DL
LDLC SERPL CALC-MCNC: 87 MG/DL (ref 0–100)
NONHDLC SERPL-MCNC: 119 MG/DL
TRIGL SERPL-MCNC: 158 MG/DL
TSH SERPL DL<=0.05 MIU/L-ACNC: 2.12 UIU/ML (ref 0.45–4.5)

## 2023-05-23 LAB
EST. AVERAGE GLUCOSE BLD GHB EST-MCNC: 97 MG/DL
HBA1C MFR BLD: 5 %

## 2023-07-12 ENCOUNTER — OFFICE VISIT (OUTPATIENT)
Dept: FAMILY MEDICINE CLINIC | Facility: CLINIC | Age: 28
End: 2023-07-12
Payer: COMMERCIAL

## 2023-07-12 VITALS
BODY MASS INDEX: 28.35 KG/M2 | HEART RATE: 79 BPM | OXYGEN SATURATION: 98 % | HEIGHT: 78 IN | SYSTOLIC BLOOD PRESSURE: 140 MMHG | TEMPERATURE: 97.8 F | WEIGHT: 245 LBS | DIASTOLIC BLOOD PRESSURE: 78 MMHG

## 2023-07-12 DIAGNOSIS — F90.2 ATTENTION DEFICIT HYPERACTIVITY DISORDER (ADHD), COMBINED TYPE: Primary | ICD-10-CM

## 2023-07-12 PROCEDURE — 99214 OFFICE O/P EST MOD 30 MIN: CPT | Performed by: NURSE PRACTITIONER

## 2023-07-12 RX ORDER — DEXTROAMPHETAMINE SACCHARATE, AMPHETAMINE ASPARTATE MONOHYDRATE, DEXTROAMPHETAMINE SULFATE AND AMPHETAMINE SULFATE 2.5; 2.5; 2.5; 2.5 MG/1; MG/1; MG/1; MG/1
10 CAPSULE, EXTENDED RELEASE ORAL EVERY MORNING
Qty: 30 CAPSULE | Refills: 0 | Status: SHIPPED | OUTPATIENT
Start: 2023-07-12

## 2023-07-12 NOTE — PROGRESS NOTES
Name: Delbert Guzman      : 1995      MRN: [id number]  Encounter Provider: VIKI Figueroa  Encounter Date: 2023   Encounter department: [address] [place] [address]. Attention deficit hyperactivity disorder (ADHD), combined type  -     amphetamine-dextroamphetamine (ADDERALL XR, 10MG,) 10 MG 24 hr capsule; Take 1 capsule (10 mg total) by mouth every morning Max Daily Amount: 10 mg    Discussed with patient plan to treat with XR 10 mg daily and reassess in 1 month  Patient instructed to call if no improvement in 72 hours or symptoms worsen       Subjective      28 y.o.male presenting with trouble focusing for the past few months. He reports that he has had difficulty focusing and completing tasks since he was 17years old but always felt that it was related to his thyroid issues. Patient now has stable thyroid levels but continues with the difficulty focusing-inattentiveness. 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 He has been noticing and so has his girlfriend that he does not complete his tasks or he needs repetitive reminders on what tasks need to be completed. Patient completed a ADHD self assessment tool which does show tendency towards ADHD behaviors. Discussed with patient possible underlying depression or anxiety but his PHQ-9 and general anxiety assessment were within normal limits. Patient reports that these behaviors have started to create an impact in his relationships. He spoke to some of the doctors at work and they suggested that he talk to his PCP regarding potential ADHD diagnosis. Review of Systems   Constitutional: Negative. Respiratory: Negative. Cardiovascular: Negative. Gastrointestinal: Negative. Musculoskeletal: Negative. Neurological: Negative. Psychiatric/Behavioral: Positive for decreased concentration. Negative for agitation, behavioral problems and confusion. The patient is hyperactive. The patient is not nervous/anxious. Current Outpatient Medications on File Prior to Visit   Medication Sig   • levothyroxine (Synthroid) 75 mcg tablet Take 1 tablet (75 mcg total) by mouth daily in the early morning       Objective     /78 (BP Location: Right arm, Patient Position: Sitting)   Pulse 79   Temp 97.8 °F (36.6 °C)   Ht 6' 6" (1.981 m)   Wt 111 kg (245 lb)   SpO2 98%   BMI 28.31 kg/m² (Reviewed)    Physical Exam  Vitals reviewed. Constitutional:       General: He is not in acute distress. Appearance: He is well-developed and well-groomed. He is not ill-appearing. HENT:      Head: Normocephalic and atraumatic. Eyes:      General: Lids are normal.      Extraocular Movements: Extraocular movements intact. Conjunctiva/sclera: Conjunctivae normal.      Pupils: Pupils are equal, round, and reactive to light. Neck:      Trachea: Trachea and phonation normal.   Cardiovascular:      Rate and Rhythm: Normal rate and regular rhythm. Heart sounds: Normal heart sounds. Pulmonary:      Effort: Pulmonary effort is normal.      Breath sounds: Normal breath sounds. Skin:     General: Skin is warm and dry. Capillary Refill: Capillary refill takes less than 2 seconds. Neurological:      General: No focal deficit present. Mental Status: He is alert and oriented to person, place, and time. Cranial Nerves: Cranial nerves 2-12 are intact. Psychiatric:         Attention and Perception: Attention normal.         Mood and Affect: Mood normal.         Speech: Speech normal.         Behavior: Behavior normal. Behavior is cooperative.        [address] [facility] [place] [facility]

## 2023-07-26 DIAGNOSIS — F90.2 ATTENTION DEFICIT HYPERACTIVITY DISORDER (ADHD), COMBINED TYPE: Primary | ICD-10-CM

## 2023-07-26 RX ORDER — DEXTROAMPHETAMINE SACCHARATE, AMPHETAMINE ASPARTATE MONOHYDRATE, DEXTROAMPHETAMINE SULFATE AND AMPHETAMINE SULFATE 5; 5; 5; 5 MG/1; MG/1; MG/1; MG/1
20 CAPSULE, EXTENDED RELEASE ORAL EVERY MORNING
Qty: 30 CAPSULE | Refills: 0 | Status: SHIPPED | OUTPATIENT
Start: 2023-07-26 | End: 2023-08-17 | Stop reason: SDUPTHER

## 2023-08-17 DIAGNOSIS — F90.2 ATTENTION DEFICIT HYPERACTIVITY DISORDER (ADHD), COMBINED TYPE: ICD-10-CM

## 2023-08-17 RX ORDER — DEXTROAMPHETAMINE SACCHARATE, AMPHETAMINE ASPARTATE MONOHYDRATE, DEXTROAMPHETAMINE SULFATE AND AMPHETAMINE SULFATE 5; 5; 5; 5 MG/1; MG/1; MG/1; MG/1
20 CAPSULE, EXTENDED RELEASE ORAL EVERY MORNING
Qty: 30 CAPSULE | Refills: 0 | Status: SHIPPED | OUTPATIENT
Start: 2023-08-17

## 2023-08-17 NOTE — TELEPHONE ENCOUNTER
1 [id number] 07/26/2023 07/26/2023 Mixed Amphetamine Salts (Capsule, Extended Release) 30.0 30 20 [facility] Commercial Insurance 0 / 0 [place]    1 [id number] 07/12/2023 07/12/2023  30.0 30  NA [address]

## 2023-08-29 ENCOUNTER — OFFICE VISIT (OUTPATIENT)
Dept: FAMILY MEDICINE CLINIC | Facility: CLINIC | Age: 28
End: 2023-08-29
Payer: COMMERCIAL

## 2023-08-29 VITALS
WEIGHT: 226.5 LBS | DIASTOLIC BLOOD PRESSURE: 80 MMHG | SYSTOLIC BLOOD PRESSURE: 118 MMHG | OXYGEN SATURATION: 98 % | HEART RATE: 78 BPM | HEIGHT: 78 IN | TEMPERATURE: 97.2 F | BODY MASS INDEX: 26.21 KG/M2

## 2023-08-29 DIAGNOSIS — F90.2 ATTENTION DEFICIT HYPERACTIVITY DISORDER (ADHD), COMBINED TYPE: ICD-10-CM

## 2023-08-29 DIAGNOSIS — Z00.00 ANNUAL PHYSICAL EXAM: Primary | ICD-10-CM

## 2023-08-29 DIAGNOSIS — E03.9 ACQUIRED HYPOTHYROIDISM: ICD-10-CM

## 2023-08-29 PROCEDURE — 99395 PREV VISIT EST AGE 18-39: CPT | Performed by: NURSE PRACTITIONER

## 2023-08-29 RX ORDER — DEXTROAMPHETAMINE SACCHARATE, AMPHETAMINE ASPARTATE, DEXTROAMPHETAMINE SULFATE AND AMPHETAMINE SULFATE 1.25; 1.25; 1.25; 1.25 MG/1; MG/1; MG/1; MG/1
5 TABLET ORAL DAILY
Qty: 30 TABLET | Refills: 0 | Status: SHIPPED | OUTPATIENT
Start: 2023-08-29

## 2023-08-29 NOTE — PROGRESS NOTES
[facility]    NAME: Man Gutierrez  AGE: 34 y.o. SEX: male  : 1995     DATE: 2023     Assessment and Plan:     Problem List Items Addressed This Visit    None  Visit Diagnoses     Annual physical exam    -  Primary    Relevant Orders    Comprehensive metabolic panel    CBC and Platelet    Acquired hypothyroidism        Relevant Orders    TSH, 3rd generation    TSH, 3rd generation with Free T4 reflex    Attention deficit hyperactivity disorder (ADHD), combined type        Relevant Medications    amphetamine-dextroamphetamine (ADDERALL, 5MG,) 5 MG tablet        Immunizations and preventive care screenings were discussed with patient today. Appropriate education was printed on patient's after visit summary. Counseling:  Alcohol/drug use: discussed moderation in alcohol intake, the recommendations for healthy alcohol use, and avoidance of illicit drug use. Dental Health: discussed importance of regular tooth brushing, flossing, and dental visits. Exercise: the importance of regular exercise/physical activity was discussed. Recommend exercise 3-5 times per week for at least 30 minutes. Return in about 1 year (around 2024). Chief Complaint:     Chief Complaint   Patient presents with   • Physical Exam      History of Present Illness:     Adult Annual Physical   Patient here for a comprehensive physical exam. The patient reports no problems. Patient reports that on weekends while at work for 12 twelve shift the Adderall seems to decrease. Discussed with patient plan to provide an immediate release Adderall 5 mg to be taken in the afternoon to prolong medication's effectiveness. Patient has also lost at least twenty pounds and would like his routine labs and thyroid level checked to see if medication adjustment is needed. Diet and Physical Activity  Diet/Nutrition: well balanced diet.    Exercise: moderate cardiovascular exercise and 5-7 times a week on average. Depression Screening  PHQ-2/9 Depression Screening    Little interest or pleasure in doing things: 0 - not at all  Feeling down, depressed, or hopeless: 0 - not at all  PHQ-2 Score: 0  PHQ-2 Interpretation: Negative depression screen       General Health  Sleep: sleeps well and gets 7-8 hours of sleep on average. Hearing: normal - bilateral.  Vision: no vision problems. Dental: regular dental visits and brushes teeth twice daily.  Health  History of STDs?: no.     Review of Systems:     Review of Systems   Constitutional: Negative. Negative for activity change, appetite change and unexpected weight change. HENT: Negative for dental problem, ear pain, hearing loss, nosebleeds, sneezing, sore throat, tinnitus and trouble swallowing. Eyes: Negative for visual disturbance. Respiratory: Negative for cough, chest tightness, shortness of breath and wheezing. Cardiovascular: Negative for chest pain, palpitations and leg swelling. Gastrointestinal: Negative for abdominal distention, abdominal pain, constipation, diarrhea and nausea. Endocrine: Negative for polydipsia, polyphagia and polyuria. Genitourinary: Negative. Musculoskeletal: Negative for arthralgias, back pain, myalgias and neck pain. Skin: Negative for color change and rash. Allergic/Immunologic: Negative for environmental allergies. Neurological: Negative for dizziness, weakness, light-headedness and headaches. Hematological: Negative. Psychiatric/Behavioral: Negative. Negative for dysphoric mood and sleep disturbance. The patient is not nervous/anxious.        Past Medical History:     Past Medical History:   Diagnosis Date   • Hypothyroidism     childhood      Past Surgical History:     Past Surgical History:   Procedure Laterality Date   • ANTERIOR CRUCIATE LIGAMENT REPAIR Left       Social History:     Social History     Socioeconomic History   • Marital status: Single     Spouse name: None   • Number of children: None   • Years of education: None   • Highest education level: None   Occupational History   • None   Tobacco Use   • Smoking status: Never     Passive exposure: Never   • Smokeless tobacco: Never   Vaping Use   • Vaping Use: Never used   Substance and Sexual Activity   • Alcohol use: Yes     Comment: social   • Drug use: Never   • Sexual activity: None   Other Topics Concern   • None   Social History Narrative   • None     Social Determinants of Health     Financial Resource Strain: Not on file   Food Insecurity: Not on file   Transportation Needs: Not on file   Physical Activity: Not on file   Stress: Not on file   Social Connections: Not on file   Intimate Partner Violence: Not on file   Housing Stability: Not on file      Family History:     Family History   Problem Relation Age of Onset   • Hypertension Mother    • Diabetes Father    • Diabetes Paternal Grandfather       Current Medications:     Current Outpatient Medications   Medication Sig Dispense Refill   • amphetamine-dextroamphetamine (ADDERALL XR, 20MG,) 20 MG 24 hr capsule Take 1 capsule (20 mg total) by mouth every morning Max Daily Amount: 20 mg 30 capsule 0   • amphetamine-dextroamphetamine (ADDERALL, 5MG,) 5 MG tablet Take 1 tablet (5 mg total) by mouth daily Max Daily Amount: 5 mg 30 tablet 0   • levothyroxine (Synthroid) 75 mcg tablet Take 1 tablet (75 mcg total) by mouth daily in the early morning 90 tablet 3     No current facility-administered medications for this visit. Allergies:     No Known Allergies   Physical Exam:     /80 (BP Location: Right arm, Patient Position: Sitting)   Pulse 78   Temp (!) 97.2 °F (36.2 °C)   Ht 6' 6" (1.981 m)   Wt 103 kg (226 lb 8 oz)   SpO2 98%   BMI 26.17 kg/m² (Reviewed)    Physical Exam  Vitals reviewed. Constitutional:       Appearance: He is not ill-appearing. HENT:      Head: Normocephalic and atraumatic.       Right Ear: Tympanic membrane, ear canal and external ear normal.      Left Ear: Tympanic membrane, ear canal and external ear normal.      Nose: Nose normal.      Mouth/Throat:      Mouth: Mucous membranes are moist.      Pharynx: Oropharynx is clear. Eyes:      Extraocular Movements: Extraocular movements intact. Conjunctiva/sclera: Conjunctivae normal.      Pupils: Pupils are equal, round, and reactive to light. Neck:      Thyroid: No thyromegaly or thyroid tenderness. Trachea: Trachea and phonation normal.   Cardiovascular:      Rate and Rhythm: Normal rate and regular rhythm. Heart sounds: Normal heart sounds. Pulmonary:      Effort: Pulmonary effort is normal.      Breath sounds: Normal breath sounds. Abdominal:      General: Abdomen is flat. Bowel sounds are normal. There is no distension. Palpations: Abdomen is soft. Tenderness: There is no abdominal tenderness. Musculoskeletal:      Cervical back: Neck supple. Skin:     General: Skin is warm and dry. Capillary Refill: Capillary refill takes less than 2 seconds. Neurological:      Mental Status: He is alert and oriented to person, place, and time. Cranial Nerves: Cranial nerves 2-12 are intact.    Psychiatric:         Mood and Affect: Mood normal.         Behavior: Behavior normal.          Judge Hickman, [address] [facility]

## 2023-09-22 DIAGNOSIS — F90.2 ATTENTION DEFICIT HYPERACTIVITY DISORDER (ADHD), COMBINED TYPE: ICD-10-CM

## 2023-09-22 RX ORDER — DEXTROAMPHETAMINE SACCHARATE, AMPHETAMINE ASPARTATE MONOHYDRATE, DEXTROAMPHETAMINE SULFATE AND AMPHETAMINE SULFATE 5; 5; 5; 5 MG/1; MG/1; MG/1; MG/1
20 CAPSULE, EXTENDED RELEASE ORAL EVERY MORNING
Qty: 30 CAPSULE | Refills: 0 | Status: SHIPPED | OUTPATIENT
Start: 2023-09-22

## 2023-10-10 DIAGNOSIS — F90.2 ATTENTION DEFICIT HYPERACTIVITY DISORDER (ADHD), COMBINED TYPE: ICD-10-CM

## 2023-10-10 RX ORDER — DEXTROAMPHETAMINE SACCHARATE, AMPHETAMINE ASPARTATE, DEXTROAMPHETAMINE SULFATE AND AMPHETAMINE SULFATE 1.25; 1.25; 1.25; 1.25 MG/1; MG/1; MG/1; MG/1
5 TABLET ORAL DAILY
Qty: 30 TABLET | Refills: 0 | Status: SHIPPED | OUTPATIENT
Start: 2023-10-10

## 2023-10-11 ENCOUNTER — APPOINTMENT (OUTPATIENT)
Dept: LAB | Facility: CLINIC | Age: 28
End: 2023-10-11
Payer: COMMERCIAL

## 2023-10-11 DIAGNOSIS — E03.9 ACQUIRED HYPOTHYROIDISM: ICD-10-CM

## 2023-10-11 DIAGNOSIS — Z00.00 ANNUAL PHYSICAL EXAM: ICD-10-CM

## 2023-10-11 LAB
ALBUMIN SERPL BCP-MCNC: 4.8 G/DL (ref 3.5–5)
ALP SERPL-CCNC: 64 U/L (ref 34–104)
ALT SERPL W P-5'-P-CCNC: 15 U/L (ref 7–52)
ANION GAP SERPL CALCULATED.3IONS-SCNC: 7 MMOL/L
AST SERPL W P-5'-P-CCNC: 16 U/L (ref 13–39)
BILIRUB SERPL-MCNC: 0.94 MG/DL (ref 0.2–1)
BUN SERPL-MCNC: 18 MG/DL (ref 5–25)
CALCIUM SERPL-MCNC: 9.9 MG/DL (ref 8.4–10.2)
CHLORIDE SERPL-SCNC: 102 MMOL/L (ref 96–108)
CO2 SERPL-SCNC: 29 MMOL/L (ref 21–32)
CREAT SERPL-MCNC: 1.22 MG/DL (ref 0.6–1.3)
ERYTHROCYTE [DISTWIDTH] IN BLOOD BY AUTOMATED COUNT: 12.2 % (ref 11.6–15.1)
GFR SERPL CREATININE-BSD FRML MDRD: 81 ML/MIN/1.73SQ M
GLUCOSE SERPL-MCNC: 89 MG/DL (ref 65–140)
HCT VFR BLD AUTO: 45.7 % (ref 36.5–49.3)
HGB BLD-MCNC: 15.7 G/DL (ref 12–17)
MCH RBC QN AUTO: 30.8 PG (ref 26.8–34.3)
MCHC RBC AUTO-ENTMCNC: 34.4 G/DL (ref 31.4–37.4)
MCV RBC AUTO: 90 FL (ref 82–98)
PLATELET # BLD AUTO: 245 THOUSANDS/UL (ref 149–390)
PMV BLD AUTO: 9.3 FL (ref 8.9–12.7)
POTASSIUM SERPL-SCNC: 3.8 MMOL/L (ref 3.5–5.3)
PROT SERPL-MCNC: 7.1 G/DL (ref 6.4–8.4)
RBC # BLD AUTO: 5.09 MILLION/UL (ref 3.88–5.62)
SODIUM SERPL-SCNC: 138 MMOL/L (ref 135–147)
TSH SERPL DL<=0.05 MIU/L-ACNC: 3.7 UIU/ML (ref 0.45–4.5)
WBC # BLD AUTO: 6.48 THOUSAND/UL (ref 4.31–10.16)

## 2023-10-11 PROCEDURE — 80053 COMPREHEN METABOLIC PANEL: CPT

## 2023-10-11 PROCEDURE — 84443 ASSAY THYROID STIM HORMONE: CPT

## 2023-10-11 PROCEDURE — 36415 COLL VENOUS BLD VENIPUNCTURE: CPT

## 2023-10-11 PROCEDURE — 85027 COMPLETE CBC AUTOMATED: CPT

## 2023-10-16 DIAGNOSIS — F90.2 ATTENTION DEFICIT HYPERACTIVITY DISORDER (ADHD), COMBINED TYPE: ICD-10-CM

## 2023-10-16 RX ORDER — DEXTROAMPHETAMINE SACCHARATE, AMPHETAMINE ASPARTATE MONOHYDRATE, DEXTROAMPHETAMINE SULFATE AND AMPHETAMINE SULFATE 5; 5; 5; 5 MG/1; MG/1; MG/1; MG/1
20 CAPSULE, EXTENDED RELEASE ORAL EVERY MORNING
Qty: 30 CAPSULE | Refills: 0 | Status: SHIPPED | OUTPATIENT
Start: 2023-10-16

## 2023-10-16 NOTE — TELEPHONE ENCOUNTER
1 [id number] 10/10/2023 10/10/2023 Amphetamine Salt Combo (Tablet) 30.0 30 1.25 MG / 1.25 MG / 1.25 MG / 1.25 MG NA [facility] [facility] Commercial Insurance 0 / 0 [place]    1 [id number] 09/22/2023 09/22/2023 Mixed Amphetamine Salts (Capsule, Extended Release) 30.0 30 20 MG NA [facility] [place] 0 / 0 [place]    1 [id number] 08/29/2023 08/29/2023 Amphetamine Salt Combo (Tablet) 30.0 30 1.25 MG / 1.25 MG / 1.25 MG / 1.25 MG NA [employer] [facility] [facility] Commercial Insurance 0 / 0 [place]    1 [id number] 08/17/2023 08/17/2023 Mixed Amphetamine Salts (Capsule, Extended Release) 30.0 30 20 MG NA [employer] [facility]'[address] 0 / 0 [place]    1 [id number] 07/26/2023 07/26/2023 Mixed Amphetamine Salts (Capsule, Extended Release) 30.0 30 20 MG NA [employer] [facility]'[address] 0 / 0 [place]    1 [id number] 07/12/2023 07/12/2023 Amphetamine Salts, Extended Release (Capsule) 30.0 30 ER 10 MG NA [employer] [address] 0 / 0

## 2023-11-14 DIAGNOSIS — F90.2 ATTENTION DEFICIT HYPERACTIVITY DISORDER (ADHD), COMBINED TYPE: ICD-10-CM

## 2023-11-14 DIAGNOSIS — E03.9 ACQUIRED HYPOTHYROIDISM: ICD-10-CM

## 2023-11-14 RX ORDER — DEXTROAMPHETAMINE SACCHARATE, AMPHETAMINE ASPARTATE MONOHYDRATE, DEXTROAMPHETAMINE SULFATE AND AMPHETAMINE SULFATE 5; 5; 5; 5 MG/1; MG/1; MG/1; MG/1
20 CAPSULE, EXTENDED RELEASE ORAL EVERY MORNING
Qty: 30 CAPSULE | Refills: 0 | Status: SHIPPED | OUTPATIENT
Start: 2023-11-14

## 2023-11-14 RX ORDER — DEXTROAMPHETAMINE SACCHARATE, AMPHETAMINE ASPARTATE, DEXTROAMPHETAMINE SULFATE AND AMPHETAMINE SULFATE 1.25; 1.25; 1.25; 1.25 MG/1; MG/1; MG/1; MG/1
5 TABLET ORAL DAILY
Qty: 30 TABLET | Refills: 0 | Status: SHIPPED | OUTPATIENT
Start: 2023-11-14

## 2023-11-14 RX ORDER — LEVOTHYROXINE SODIUM 0.07 MG/1
75 TABLET ORAL
Qty: 90 TABLET | Refills: 0 | Status: SHIPPED | OUTPATIENT
Start: 2023-11-14

## 2023-11-14 NOTE — TELEPHONE ENCOUNTER
1 [id number] 10/17/2023 10/16/2023 Mixed Amphetamine Salts (Capsule, Extended Release) 30.0 30 20 MG NA [facility] Commercial Insurance 0 / 0 [place]    1 [id number] 10/10/2023 10/10/2023 Amphetamine Salt Combo (Tablet) 30.0 30 1.25 MG / 1.25 MG / 1.25 MG / 1.25 MG NA [facility] Commercial Insurance 0 / 0 [place]    1 [id number] 09/22/2023 09/22/2023 Mixed Amphetamine Salts (Capsule, Extended Release) 30.0 30 20 MG NA WAYNE JASBIR [facility] [place] 0 / 0 [place]    1 [id number] 08/29/2023 08/29/2023 Amphetamine Salt Combo (Tablet) 30.0 30 1.25 MG / 1.25 MG / 1.25 MG / 1.25 MG NA [facility] Commercial Insurance 0 / 0 [place]    1 [id number] 08/17/2023 08/17/2023 Mixed Amphetamine Salts (Capsule, Extended Release) 30.0 30 20 MG NA [facility] [address] [place] [address] 0 / 0 [place]    1 [id number] 07/26/2023 07/26/2023 Mixed Amphetamine Salts (Capsule, Extended Release) 30.0 30 20 MG NA [facility]WOODE[facility]'[address] 0 / 0 [place]    1 [id number] 07/12/2023 07/12/2023 Amphetamine Salts, Extended Release (Capsule) 30.0 30 ER 10 MG NA [facility]'[address] 0 / 0

## 2023-11-14 NOTE — TELEPHONE ENCOUNTER
1 [id number] 10/17/2023 10/16/2023 Mixed Amphetamine Salts (Capsule, Extended Release) 30.0 30 20 MG NA [facility] Commercial Insurance 0 / 0 [place]    1 [id number] 10/10/2023 10/10/2023 Amphetamine Salt Combo (Tablet) 30.0 30 1.25 MG / 1.25 MG / 1.25 MG / 1.25 MG NA DAWOODGemfire [facility] Commercial Insurance 0 / 0 [place]    1 [id number] 09/22/2023 09/22/2023 Mixed Amphetamine Salts (Capsule, Extended Release) 30.0 30 20 MG NA WAYNE MAGNOLIAT [facility] [place] Insurance 0 / 0 [place]    1 [id number] 08/29/2023 08/29/2023 Amphetamine Salt Combo (Tablet) 30.0 30 1.25 MG / 1.25 MG / 1.25 MG / 1.25 MG NA [facility] Commercial Insurance 0 / 0 [place]    1 [id number] 08/17/2023 08/17/2023 Mixed Amphetamine Salts (Capsule, Extended Release) 30.0 30 20 MG NA [facility] [address] [place] [address] 0 / 0 [place]    1 [id number] 07/26/2023 07/26/2023 Mixed Amphetamine Salts (Capsule, Extended Release) 30.0 30 20 MG NA DAWOODGemfire [facility]'[address] 0 / 0 [place]    1 [id number] 07/12/2023 07/12/2023 Amphetamine Salts, Extended Release (Capsule) 30.0 30 ER 10 MG NA Gladit[facility]'[address] 0 / 0

## 2023-12-13 DIAGNOSIS — F90.2 ATTENTION DEFICIT HYPERACTIVITY DISORDER (ADHD), COMBINED TYPE: ICD-10-CM

## 2023-12-13 RX ORDER — DEXTROAMPHETAMINE SACCHARATE, AMPHETAMINE ASPARTATE MONOHYDRATE, DEXTROAMPHETAMINE SULFATE AND AMPHETAMINE SULFATE 5; 5; 5; 5 MG/1; MG/1; MG/1; MG/1
20 CAPSULE, EXTENDED RELEASE ORAL EVERY MORNING
Qty: 30 CAPSULE | Refills: 0 | Status: SHIPPED | OUTPATIENT
Start: 2023-12-13

## 2023-12-13 RX ORDER — DEXTROAMPHETAMINE SACCHARATE, AMPHETAMINE ASPARTATE, DEXTROAMPHETAMINE SULFATE AND AMPHETAMINE SULFATE 1.25; 1.25; 1.25; 1.25 MG/1; MG/1; MG/1; MG/1
5 TABLET ORAL DAILY
Qty: 30 TABLET | Refills: 0 | Status: SHIPPED | OUTPATIENT
Start: 2023-12-13

## 2023-12-13 NOTE — TELEPHONE ENCOUNTER
1 [id number] 11/14/2023 11/14/2023 Amphetamine Salt Combo (Tablet) 30.0 30 1.25 MG / 1.25 MG / 1.25 MG / 1.25 MG NA [employer] [facility] Commercial Insurance 0 / 0 [place]    1 [id number] 11/14/2023 11/14/2023 Mixed Amphetamine Salts (Capsule, Extended Release) 30.0 30 20 MG NA [employer] [facility] Commercial Insurance 0 / 0 [place]    1 [id number] 10/17/2023 10/16/2023 Mixed Amphetamine Salts (Capsule, Extended Release) 30.0 30 20 MG NA [employer] [facility] [address] [place] [address] 0 / 0 [place]    1 [id number] 10/10/2023 10/10/2023 Amphetamine Salt Combo (Tablet) 30.0 30 1.25 MG / 1.25 MG / 1.25 MG / 1.25 MG NA [employer] [facility]'[address] 0 / 0 [place]    1 [id number] 09/22/2023 09/22/2023 Mixed Amphetamine Salts (Capsule, Extended Release) 30.0 30 20 MG NA WAYNE MARTELL [facility] [place] / 0 [place]    1 [id number] 08/29/2023 08/29/2023 Amphetamine Salt Combo (Tablet) 30.0 30 1.25 MG / 1.25 MG / 1.25 MG / 1.25 MG NA [employer] [facility] Commercial Insurance 0 / 0 [place]    1 [id number] 08/17/2023 08/17/2023 Mixed Amphetamine Salts (Capsule, Extended Release) 30.0 30 20 MG NA [employer] [facility] [address] [place] [address] 0 / 0 [place]    1 [id number] 07/26/2023 07/26/2023 Mixed Amphetamine Salts (Capsule, Extended Release) 30.0 30 20 MG NA [address]YourGuide [facility]'[address] 0 / 0 [place]    1 [id number] 07/12/2023 07/12/2023 Amphetamine Salts, Extended Release (Capsule) 30.0 30 ER 10 MG NA DAWOODGetYourGuide [facility] [address] [place] [address] 0 / 0

## 2023-12-21 ENCOUNTER — IMMUNIZATIONS (OUTPATIENT)
Dept: FAMILY MEDICINE CLINIC | Facility: CLINIC | Age: 28
End: 2023-12-21
Payer: COMMERCIAL

## 2023-12-21 ENCOUNTER — TELEPHONE (OUTPATIENT)
Dept: FAMILY MEDICINE CLINIC | Facility: CLINIC | Age: 28
End: 2023-12-21

## 2023-12-21 DIAGNOSIS — Z23 ENCOUNTER FOR IMMUNIZATION: Primary | ICD-10-CM

## 2023-12-21 PROCEDURE — 90471 IMMUNIZATION ADMIN: CPT

## 2023-12-21 PROCEDURE — 90686 IIV4 VACC NO PRSV 0.5 ML IM: CPT

## 2023-12-21 NOTE — TELEPHONE ENCOUNTER
Patient would like his PCP to look at his immunization records and make sure he is all up to date on everything

## 2024-01-15 DIAGNOSIS — F90.2 ATTENTION DEFICIT HYPERACTIVITY DISORDER (ADHD), COMBINED TYPE: ICD-10-CM

## 2024-01-16 RX ORDER — DEXTROAMPHETAMINE SACCHARATE, AMPHETAMINE ASPARTATE MONOHYDRATE, DEXTROAMPHETAMINE SULFATE AND AMPHETAMINE SULFATE 5; 5; 5; 5 MG/1; MG/1; MG/1; MG/1
20 CAPSULE, EXTENDED RELEASE ORAL EVERY MORNING
Qty: 30 CAPSULE | Refills: 0 | Status: SHIPPED | OUTPATIENT
Start: 2024-01-16

## 2024-01-16 RX ORDER — DEXTROAMPHETAMINE SACCHARATE, AMPHETAMINE ASPARTATE, DEXTROAMPHETAMINE SULFATE AND AMPHETAMINE SULFATE 1.25; 1.25; 1.25; 1.25 MG/1; MG/1; MG/1; MG/1
5 TABLET ORAL DAILY
Qty: 30 TABLET | Refills: 0 | Status: SHIPPED | OUTPATIENT
Start: 2024-01-16

## 2024-01-16 NOTE — TELEPHONE ENCOUNTER
1 [id number] 12/13/2023 12/13/2023 Amphetamine Salt Combo (Tablet) 30.0 30 1.25 MG / 1.25 MG / 1.25 MG / 1.25 MG NA [facility] [place] 0 / 0 [place]    1 [id number] 12/13/2023 12/13/2023 Amphetamine Salts, Extended Release (Oral Tablet) 30.0 30 ER 20 MG NA [facility] [place] 0 / 0 [place]    1 [id number] 11/14/2023 11/14/2023 Amphetamine Salt Combo (Tablet) 30.0 30 1.25 MG / 1.25 MG / 1.25 MG / 1.25 MG NA [facility] [place] 0 / 0 [place]    1 [id number] 11/14/2023 11/14/2023 Mixed Amphetamine Salts (Capsule, Extended Release) 30.0 30 20 MG NA [facility] Commercial Insurance 0 / 0 [place]    1 [id number] 10/17/2023 10/16/2023 Mixed Amphetamine Salts (Capsule, Extended Release) 30.0 30 20 MG NA [facility] Commercial Insurance 0 / 0 [place]    1 [id number] 10/10/2023 10/10/2023 Amphetamine Salt Combo (Tablet) 30.0 30 1.25 MG / 1.25 MG / 1.25 MG / 1.25 MG NA [facility] [place] 0 / 0 [place]    1 [id number] 09/22/2023 09/22/2023 Mixed Amphetamine Salts (Capsule, Extended Release) 30.0 30 20 MG NA WAYNE BROADT [facility] Commercial [place] 0 / 0 [place]    1 [id number] 08/29/2023 08/29/2023 Amphetamine Salt Combo (Tablet) 30.0 30 1.25 MG / 1.25 MG / 1.25 MG / 1.25 MG NA [facility] Commercial [place] 0 / 0 [place]    1 [id number] 08/17/2023 08/17/2023 Mixed Amphetamine Salts (Capsule, Extended Release) 30.0 30 20 MG NA [facility] [place] 0 / 0 [place]    1 [id number] 07/26/2023 07/26/2023 Mixed Amphetamine Salts (Capsule, Extended Release) 30.0 30 20 MG NA [facility] Comm

## 2024-02-02 DIAGNOSIS — F90.2 ATTENTION DEFICIT HYPERACTIVITY DISORDER (ADHD), COMBINED TYPE: ICD-10-CM

## 2024-02-02 RX ORDER — DEXTROAMPHETAMINE SACCHARATE, AMPHETAMINE ASPARTATE, DEXTROAMPHETAMINE SULFATE AND AMPHETAMINE SULFATE 2.5; 2.5; 2.5; 2.5 MG/1; MG/1; MG/1; MG/1
10 TABLET ORAL DAILY
Qty: 30 TABLET | Refills: 0 | Status: SHIPPED | OUTPATIENT
Start: 2024-02-02

## 2024-02-16 DIAGNOSIS — F90.2 ATTENTION DEFICIT HYPERACTIVITY DISORDER (ADHD), COMBINED TYPE: ICD-10-CM

## 2024-02-16 RX ORDER — DEXTROAMPHETAMINE SACCHARATE, AMPHETAMINE ASPARTATE MONOHYDRATE, DEXTROAMPHETAMINE SULFATE AND AMPHETAMINE SULFATE 5; 5; 5; 5 MG/1; MG/1; MG/1; MG/1
20 CAPSULE, EXTENDED RELEASE ORAL EVERY MORNING
Qty: 30 CAPSULE | Refills: 0 | Status: SHIPPED | OUTPATIENT
Start: 2024-02-16

## 2024-02-16 NOTE — TELEPHONE ENCOUNTER
[id number] 02/02/2024 02/02/2024 Amphetamine Salt Combo (Tablet) 30.0 30 2.5 MG / 2.5 MG / 2.5 MG / 2.5 MG NA [facility] [place] 0 / 0 [place]     1 [id number] 01/16/2024 01/16/2024 Amphetamine Salt Combo (Tablet) 30.0 30 1.25 MG / 1.25 MG / 1.25 MG / 1.25 MG NA [facility] Commercial Insurance 0 / 0 [place]    1 [id number] 01/16/2024 01/16/2024 Amphetamine Salts, Extended Release (Oral Tablet) 30.0 30 ER 20 MG NA [facility] Commercial Insurance 0 / 0 [place]    1 [id number] 12/13/2023 12/13/2023 Amphetamine Salt Combo (Tablet) 30.0 30 1.25 MG / 1.25 MG / 1.25 MG / 1.25 MG NA [facility] [place] 0 / 0 [place]    1 [id number] 12/13/2023 12/13/2023 Amphetamine Salts, Extended Release (Oral Tablet) 30.0 30 ER 20 MG NA [facility] [place] 0 / 0 [place]

## 2024-02-16 NOTE — TELEPHONE ENCOUNTER
Reason for call:   [x] Refill   [] Prior Auth  [] Other:     Office:   [x] PCP/Provider -   [] Specialty/Provider -     PATIENT MOVED TO [place] A WEEK AGO, AND NEEDS A REFILL, HE DIDN'T HAVE TIME TO REFILL HIS MEDICATION BEFORE HE LEFT, HE WILL FIND A NEW DR IN [place]    Medication: ADDERALL XR    Dose/Frequency: 20 MG    Quantity: 30    Pharmacy:   [address]    Does the patient have enough for 3 days?   [x] Yes   [] No - Send as HP to POD

## 2024-02-21 ENCOUNTER — TELEPHONE (OUTPATIENT)
Age: 29
End: 2024-02-21

## 2024-02-21 NOTE — TELEPHONE ENCOUNTER
Patient called stating he has moved to [place] and has not been set up with a new PCP yet. He will be starting a new job next week and states he needs a letter from [employer] allowing him to be able to take his Adderall at work. If [employer] is able to do this for the patient, please email letter to his employer at: [email or web address]

## 2024-02-29 NOTE — LETTER
Archana Caraballo [facility] [place] [id number]  Dept: [phone]    March 20, 2022    Patient: Peter Castillo  YOB: 1995    Peter Castillo was seen and evaluated at our [facility]  Please note if Covid and Flu tests are negative, they may return to work when fever free for 24 hours without the use of a fever reducing agent  If Covid or Flu test is positive, they may return to work on 3/23/2022, as this is 5 days from the onset of symptoms  Upon return, they must then adhere to strict masking for an additional 5 days      Sincerely,    Lamona Gosselin Detail Level: Detailed Depth Of Biopsy: dermis Was A Bandage Applied: Yes Size Of Lesion In Cm: 0.5 X Size Of Lesion In Cm: 0 Biopsy Type: H and E Biopsy Method: Dermablade Anesthesia Type: 1% lidocaine with epinephrine Hemostasis: Electrocautery Wound Care: Petrolatum Dressing: bandage Destruction After The Procedure: No Type Of Destruction Used: Curettage Curettage Text: The wound bed was treated with curettage after the biopsy was performed. Cryotherapy Text: The wound bed was treated with cryotherapy after the biopsy was performed. Electrodesiccation Text: The wound bed was treated with electrodesiccation after the biopsy was performed. Electrodesiccation And Curettage Text: The wound bed was treated with electrodesiccation and curettage after the biopsy was performed. Silver Nitrate Text: The wound bed was treated with silver nitrate after the biopsy was performed. Lab: [id number] Lab Facility: [id number] Consent: Written consent was obtained and risks were reviewed including but not limited to scarring, infection, bleeding, scabbing, incomplete removal, nerve damage and allergy to anesthesia. Post-Care Instructions: I reviewed with the patient in detail post-care instructions. Patient is to keep the biopsy site dry overnight, and then apply bacitracin twice daily until healed. Patient may apply hydrogen peroxide soaks to remove any crusting. Notification Instructions: Patient will be notified of biopsy results. However, patient instructed to call the office if not contacted within 2 weeks. Billing Type: Third-Party Bill Information: Selecting Yes will display possible errors in your note based on the variables you have selected. This validation is only offered as a suggestion for you. PLEASE NOTE THAT THE VALIDATION TEXT WILL BE REMOVED WHEN YOU FINALIZE YOUR NOTE. IF YOU WANT TO FAX A PRELIMINARY NOTE YOU WILL NEED TO TOGGLE THIS TO 'NO' IF YOU DO NOT WANT IT IN YOUR FAXED NOTE. Size Of Lesion In Cm: 0.3

## 2024-03-18 DIAGNOSIS — F90.2 ATTENTION DEFICIT HYPERACTIVITY DISORDER (ADHD), COMBINED TYPE: ICD-10-CM

## 2024-03-18 DIAGNOSIS — E03.9 ACQUIRED HYPOTHYROIDISM: ICD-10-CM

## 2024-03-18 RX ORDER — LEVOTHYROXINE SODIUM 0.07 MG/1
75 TABLET ORAL
Qty: 90 TABLET | Refills: 1 | Status: SHIPPED | OUTPATIENT
Start: 2024-03-18

## 2024-03-18 RX ORDER — DEXTROAMPHETAMINE SACCHARATE, AMPHETAMINE ASPARTATE MONOHYDRATE, DEXTROAMPHETAMINE SULFATE AND AMPHETAMINE SULFATE 5; 5; 5; 5 MG/1; MG/1; MG/1; MG/1
20 CAPSULE, EXTENDED RELEASE ORAL EVERY MORNING
Qty: 30 CAPSULE | Refills: 0 | Status: SHIPPED | OUTPATIENT
Start: 2024-03-18

## 2024-03-18 RX ORDER — DEXTROAMPHETAMINE SACCHARATE, AMPHETAMINE ASPARTATE, DEXTROAMPHETAMINE SULFATE AND AMPHETAMINE SULFATE 2.5; 2.5; 2.5; 2.5 MG/1; MG/1; MG/1; MG/1
10 TABLET ORAL DAILY
Qty: 30 TABLET | Refills: 0 | Status: SHIPPED | OUTPATIENT
Start: 2024-03-18

## 2024-03-18 NOTE — TELEPHONE ENCOUNTER
1 [id number] 02/02/2024 02/02/2024 Amphetamine Salt Combo (Tablet) 30.0 30 2.5 MG / 2.5 MG / 2.5 MG / 2.5 [facility] [place] / [place]    1 [id number] 01/16/2024 01/16/2024 Amphetamine Salt Combo (Tablet) 30.0 30 1.25 MG / 1.25 MG / 1.25 MG / 1.25 MG NA [facility] [place] / 0 [place]    1 [id number] 01/16/2024 01/16/2024 Amphetamine Salts, Extended Release (Oral Tablet) 30.0 30 ER 20 MG [facility] [place] / [place]    1 [id number] 12/13/2023 12/13/2023 Amphetamine Salt Combo (Tablet) 30.0 30 1.25 MG / 1.25 MG / 1.25 MG / 1.25 MG [facility] [place] / [place]    1 [id number] 12/13/2023 12/13/2023 Amphetamine Salts, Extended Release (Oral Tablet) 30.0 30 ER 20 MG [facility] [place] / [place]    1 [id number] 11/14/2023 11/14/2023 Amphetamine Salt Combo (Tablet) 30.0 30 1.25 MG / 1.25 MG / 1.25 MG / 1.25 MG [facility] [place] / [place]    1 [id number] 11/14/2023 11/14/2023 Mixed Amphetamine Salts (Capsule, Extended Release) 30.0 30 20 MG NA [facility] [place] / [place]    1 [id number] 10/17/2023 10/16/2023 Mixed Amphetamine Salts (Capsule, Extended Release) 30.0 30 20 MG NA [facility]FIN[facility] [place] / 0 [place]    1 [id number] 10/10/2023 10/10/2023 Amphetamine Salt Combo (Tablet) 30.0 30 1.25 MG / 1.25 MG / 1.25 MG / 1.25 MG NA DAWOOD SALEH [facility] [place] 0 / 0 [place]    1 [id number] 09/22/2023 09/22/2023 Mixed Amphetamine Salts (Capsule, Extended Release) 30.0 30 20 MG NA [facility]LL [facility] Commercial Ins

## 2024-04-16 DIAGNOSIS — F90.2 ATTENTION DEFICIT HYPERACTIVITY DISORDER (ADHD), COMBINED TYPE: ICD-10-CM

## 2024-04-17 RX ORDER — DEXTROAMPHETAMINE SACCHARATE, AMPHETAMINE ASPARTATE MONOHYDRATE, DEXTROAMPHETAMINE SULFATE AND AMPHETAMINE SULFATE 5; 5; 5; 5 MG/1; MG/1; MG/1; MG/1
20 CAPSULE, EXTENDED RELEASE ORAL EVERY MORNING
Qty: 30 CAPSULE | Refills: 0 | Status: SHIPPED | OUTPATIENT
Start: 2024-04-17 | End: 2024-04-25 | Stop reason: SDUPTHER

## 2024-04-17 NOTE — TELEPHONE ENCOUNTER
1 [id number] 02/02/2024 02/02/2024 Amphetamine Salt Combo (Tablet) 30.0 30 2.5 MG / 2.5 MG / 2.5 MG / 2.5 MG [facility] [place] / [place]    1 [id number] 01/16/2024 01/16/2024 Amphetamine Salt Combo (Tablet) 30.0 30 1.25 MG / 1.25 MG / 1.25 MG / 1.25 MG NA [facility] [place] / 0 [place]    1 [id number] 01/16/2024 01/16/2024 Amphetamine Salts, Extended Release (Capsule, Extended Release) 30.0 30 ER 20 MG [facility] [place] / 0 [place]    1 [id number] 12/13/2023 12/13/2023 Amphetamine Salt Combo (Tablet) 30.0 30 1.25 MG / 1.25 MG / 1.25 MG / 1.25 MG [facility] [place] / 0 [place]    1 [id number] 12/13/2023 12/13/2023 Amphetamine Salts, Extended Release (Capsule, Extended Release) 30.0 30 ER 20 MG NA [facility] Commercial Insurance 0 / 0 [place]    1 [id number] 11/14/2023 11/14/2023 Amphetamine Salt Combo (Tablet) 30.0 30 1.25 MG / 1.25 MG / 1.25 MG / 1.25 MG NA [facility] [place] 0 / 0 [place]    1 [id number] 11/14/2023 11/14/2023 Mixed Amphetamine Salts (Capsule, Extended Release) 30.0 30 20 MG NA [facility] [place] / 0 [place]    1 [id number] 10/17/2023 10/16/2023 Mixed Amphetamine Salts (Capsule, Extended Release) 30.0 30 20 MG NA [facility] [place] 0 / 0 [place]    1 [id number] 10/10/2023 10/10/2023 Amphetamine Salt Combo (Tablet) 30.0 30 1.25 MG / 1.25 MG / 1.25 MG / 1.25 MG NA DAWOOD SALEH [facility] Commercial Insurance 0 / 0 [place]    1 [id number] 09/22/2023 09/22/2023 Mixed Amphetamine Salts (Capsule, Extended Release) 30.0 30 20 MG NA [facility]LL [facility] [place]

## 2024-04-25 DIAGNOSIS — F90.2 ATTENTION DEFICIT HYPERACTIVITY DISORDER (ADHD), COMBINED TYPE: ICD-10-CM

## 2024-04-26 RX ORDER — DEXTROAMPHETAMINE SACCHARATE, AMPHETAMINE ASPARTATE, DEXTROAMPHETAMINE SULFATE AND AMPHETAMINE SULFATE 2.5; 2.5; 2.5; 2.5 MG/1; MG/1; MG/1; MG/1
10 TABLET ORAL DAILY
Qty: 30 TABLET | Refills: 0 | Status: SHIPPED | OUTPATIENT
Start: 2024-04-26 | End: 2024-04-30 | Stop reason: SDUPTHER

## 2024-04-26 RX ORDER — DEXTROAMPHETAMINE SACCHARATE, AMPHETAMINE ASPARTATE MONOHYDRATE, DEXTROAMPHETAMINE SULFATE AND AMPHETAMINE SULFATE 5; 5; 5; 5 MG/1; MG/1; MG/1; MG/1
20 CAPSULE, EXTENDED RELEASE ORAL EVERY MORNING
Qty: 30 CAPSULE | Refills: 0 | Status: SHIPPED | OUTPATIENT
Start: 2024-04-26 | End: 2024-04-30 | Stop reason: SDUPTHER

## 2024-04-26 NOTE — TELEPHONE ENCOUNTER
1 [id number] 02/02/2024 02/02/2024 Amphetamine Salt Combo (Tablet) 30.0 30 2.5 MG / 2.5 MG / 2.5 MG / 2.5 MG [facility] [place] 0 / 0 [place]    1 [id number] 01/16/2024 01/16/2024 Amphetamine Salt Combo (Tablet) 30.0 30 1.25 MG / 1.25 MG / 1.25 MG / 1.25 MG NA [facility] [place] 0 / 0 [place]    1 [id number] 01/16/2024 01/16/2024 Amphetamine Salts, Extended Release (Capsule, Extended Release) 30.0 30 ER 20 MG [facility] [place] 0 / 0 [place]    1 [id number] 12/13/2023 12/13/2023 Amphetamine Salt Combo (Tablet) 30.0 30 1.25 MG / 1.25 MG / 1.25 MG / 1.25 MG [facility] [place] 0 / 0 [place]    1 [id number] 12/13/2023 12/13/2023 Amphetamine Salts, Extended Release (Capsule, Extended Release) 30.0 30 ER 20 MG NA [facility] Commercial Insurance 0 / 0 [place]    1 [id number] 11/14/2023 11/14/2023 Amphetamine Salt Combo (Tablet) 30.0 30 1.25 MG / 1.25 MG / 1.25 MG / 1.25 MG NA [facility] Commercial Insurance 0 / 0 [place]    1 [id number] 11/14/2023 11/14/2023 Mixed Amphetamine Salts (Capsule, Extended Release) 30.0 30 20 MG NA [facility] Commercial Insurance 0 / 0 [place]    1 [id number] 10/17/2023 10/16/2023 Mixed Amphetamine Salts (Capsule, Extended Release) 30.0 30 20 MG NA [facility] Commercial Insurance 0 / 0 [place]    1 [id number] 10/10/2023 10/10/2023 Amphetamine Salt Combo (Tablet) 30.0 30 1.25 MG / 1.25 MG / 1.25 MG / 1.25 MG NA DAWOOD SALEH [facility] Commercial Insurance 0 / 0 [place]    1 [id number] 09/22/2023 09/22/2023 Mixed Amphetamine Salts (Capsule, Extended Release) 30.0 30 20 MG NA WAYNE MARTELL [facility] Commercial In

## 2024-04-30 DIAGNOSIS — F90.2 ATTENTION DEFICIT HYPERACTIVITY DISORDER (ADHD), COMBINED TYPE: ICD-10-CM

## 2024-04-30 NOTE — TELEPHONE ENCOUNTER
Possible Duplicate    1 [id number] 02/02/2024 02/02/2024 Amphetamine Salt Combo (Tablet) 30.0 30 2.5 MG / 2.5 MG / 2.5 MG / 2.5 MG [facility] [place] 0 / 0 [place]    1 [id number] 01/16/2024 01/16/2024 Amphetamine Salt Combo (Tablet) 30.0 30 1.25 MG / 1.25 MG / 1.25 MG / 1.25 MG NA [facility] Commercial Insurance 0 / 0 [place]    1 [id number] 01/16/2024 01/16/2024 Amphetamine Salts, Extended Release (Capsule, Extended Release) 30.0 30 ER 20 MG [facility] [place] 0 / 0 [place]    1 [id number] 12/13/2023 12/13/2023 Amphetamine Salt Combo (Tablet) 30.0 30 1.25 MG / 1.25 MG / 1.25 MG / 1.25 MG [facility] Commercial Insurance 0 / 0 [place]    1 [id number] 12/13/2023 12/13/2023 Amphetamine Salts, Extended Release (Capsule, Extended Release) 30.0 30 ER 20 MG NA [facility] Commercial Insurance 0 / 0 [place]    1 [id number] 11/14/2023 11/14/2023 Amphetamine Salt Combo (Tablet) 30.0 30 1.25 MG / 1.25 MG / 1.25 MG / 1.25 MG [facility] Commercial Insurance 0 / 0 [place]    1 [id number] 11/14/2023 11/14/2023 Mixed Amphetamine Salts (Capsule, Extended Release) 30.0 30 20 MG NA [facility] Commercial Insurance 0 / 0 [place]    1 [id number] 10/17/2023 10/16/2023 Mixed Amphetamine Salts (Capsule, Extended Release) 30.0 30 20 MG NA [facility] Commercial Insurance 0 / 0 [place]    1 [id number] 10/10/2023 10/10/2023 Amphetamine Salt Combo (Tablet) 30.0 30 1.25 MG / 1.25 MG / 1.25 MG / 1.25 MG NA DAWOOD SALEH [facility] Commercial Insurance 0 / 0 [place]    1 [id number] 09/22/2023 09/22/2023 Mixed Amphetamine Salts (Capsule, Extended Release) 30.0 30 20 MG NA WAYNE MARTELL [facility] Commercial Insurance 0 / 0 [place]

## 2024-04-30 NOTE — TELEPHONE ENCOUNTER
Reason for call: Not a duplicate. Pt would like to try a different pharmacy being that these scripts have been sent twice to the [facility] pharmacy and the pharmacy indicated that they never received medication.   [x] Refill   [] Prior Auth  [] Other:     Office: paul shearer   [x] PCP/Provider - kd   [] Specialty/Provider -     Medication: adderall, adderall     Dose/Frequency: 10 mg, xr 20 mg/ daily     Quantity: 30 day supply     Pharmacy: [facility] [place] [facility]     Does the patient have enough for 3 days?   [] Yes   [x] No - Send as HP to POD

## 2024-05-01 RX ORDER — DEXTROAMPHETAMINE SACCHARATE, AMPHETAMINE ASPARTATE, DEXTROAMPHETAMINE SULFATE AND AMPHETAMINE SULFATE 2.5; 2.5; 2.5; 2.5 MG/1; MG/1; MG/1; MG/1
10 TABLET ORAL DAILY
Qty: 30 TABLET | Refills: 0 | Status: SHIPPED | OUTPATIENT
Start: 2024-05-01

## 2024-05-01 RX ORDER — DEXTROAMPHETAMINE SACCHARATE, AMPHETAMINE ASPARTATE MONOHYDRATE, DEXTROAMPHETAMINE SULFATE AND AMPHETAMINE SULFATE 5; 5; 5; 5 MG/1; MG/1; MG/1; MG/1
20 CAPSULE, EXTENDED RELEASE ORAL EVERY MORNING
Qty: 30 CAPSULE | Refills: 0 | Status: SHIPPED | OUTPATIENT
Start: 2024-05-01

## 2024-05-01 NOTE — TELEPHONE ENCOUNTER
Spoke with patient, patient stated he will a prior auth for the medication Adderall. Please advise.

## 2024-05-07 NOTE — TELEPHONE ENCOUNTER
Pt called in stating his new insurance is [employer] and would like his medication to be resubmitted and sent to the pharmacy in [place].He would like a call back to confirm the meds went to the pharmacy.

## 2024-05-09 ENCOUNTER — TELEPHONE (OUTPATIENT)
Age: 29
End: 2024-05-09

## 2024-05-09 NOTE — TELEPHONE ENCOUNTER
Meds were sent 05/01, called pharmacy. Patient requires medication authorization. Please initiate

## 2024-05-09 NOTE — TELEPHONE ENCOUNTER
The patient called to report that he moved to the [facility] and that he is not yet stabilized with a primary doctor and needs [facility]  to send him the two medications of adderall 10mg and 20 mg. The patient reported that [facility] sent a form to fill out and has not yet received a response. Please contact the patient.

## 2024-05-15 ENCOUNTER — TELEPHONE (OUTPATIENT)
Age: 29
End: 2024-05-15

## 2024-05-15 NOTE — TELEPHONE ENCOUNTER
PA for amphetamine-dextroamphetamine (ADDERALL, 10MG,) 10 mg tablet    Submitted via    [][employer]KEY   [x][employer]-Case ID #  [id number]    []Faxed to plan   []Other website   []Phone call Case ID #     Office notes sent, clinical questions answered. Awaiting determination    Turnaround time for your insurance to make a decision on your Prior Authorization can take 7-21 business days.

## 2024-05-15 NOTE — TELEPHONE ENCOUNTER
PA for amphetamine-dextroamphetamine (ADDERALL XR, 20MG,) 20 MG 24 hr capsule Approved     Date(s) approved 5- - 5-      Patient advised by          [x] [employer]hart Message  [] Phone call   []LMOM  []L/M to call office as no active Communication consent on file  []Unable to leave detailed message as VM not approved on Communication consent       Pharmacy advised by    [x]Fax  []Phone call    Approval letter scanned into Media Yes

## 2024-05-15 NOTE — TELEPHONE ENCOUNTER
PA for amphetamine-dextroamphetamine (ADDERALL, 10MG,) 10 mg tablet Approved     Date(s) approved 5- - 5-      Patient advised by          [x] MyChart Message  [] Phone call   []LMOM  []L/M to call office as no active Communication consent on file  []Unable to leave detailed message as VM not approved on Communication consent       Pharmacy advised by    [x]Fax  []Phone call    Approval letter scanned into Media Yes

## 2024-05-15 NOTE — TELEPHONE ENCOUNTER
PA for amphetamine-dextroamphetamine (ADDERALL XR, 20MG,) 20 MG 24 hr capsule    Submitted via    [][employer]-KEY   [x][employer]-Case ID # [id number]  []Faxed to plan   []Other website   []Phone call Case ID #     Office notes sent, clinical questions answered. Awaiting determination    Turnaround time for your insurance to make a decision on your Prior Authorization can take 7-21 business days.